# Patient Record
Sex: MALE | Race: WHITE | Employment: FULL TIME | URBAN - METROPOLITAN AREA
[De-identification: names, ages, dates, MRNs, and addresses within clinical notes are randomized per-mention and may not be internally consistent; named-entity substitution may affect disease eponyms.]

---

## 2017-06-18 ENCOUNTER — MYC REFILL (OUTPATIENT)
Dept: FAMILY MEDICINE | Facility: CLINIC | Age: 47
End: 2017-06-18

## 2017-06-18 DIAGNOSIS — R21 RASH: ICD-10-CM

## 2017-06-19 NOTE — TELEPHONE ENCOUNTER
Message from [employer]:  Original authorizing provider: MD West Weinbergkoby DUPREEDann Wallace would like a refill of the following medications:  hydrocortisone (WESTCORT) 0.2 % cream [Anthony Gutiérrez MD]    Preferred pharmacy: [facility] [id number] [address] AT [place] [address] & [facility]    Comment:

## 2017-06-19 NOTE — TELEPHONE ENCOUNTER
Hydrocortisone Cream      Last Written Prescription Date: 08/18/2015  Last Fill Quantity: 15g,  # refills: 1   Last Office Visit with [facility], [facility] or [facility] prescribing provider: 09/09/2016

## 2017-06-20 RX ORDER — HYDROCORTISONE VALERATE CREAM 2 MG/G
CREAM TOPICAL
Qty: 15 G | Refills: 1 | Status: SHIPPED | OUTPATIENT
Start: 2017-06-20 | End: 2020-02-19

## 2017-06-20 NOTE — TELEPHONE ENCOUNTER
Routing refill request to provider for review/approval because:  A break in medication  Marcia Bauman RN  [facility] [employer]

## 2017-06-20 NOTE — TELEPHONE ENCOUNTER
cpx 9/16, rx done, follow up prn, cpx fasting due in 9/17    BP Readings from Last 3 Encounters:   09/09/16 114/74   08/18/15 118/68   09/29/14 130/88

## 2017-08-25 DIAGNOSIS — I10 ESSENTIAL HYPERTENSION WITH GOAL BLOOD PRESSURE LESS THAN 140/90: ICD-10-CM

## 2017-08-25 DIAGNOSIS — E78.5 HYPERLIPIDEMIA LDL GOAL <130: ICD-10-CM

## 2017-08-25 DIAGNOSIS — F41.9 ANXIETY: ICD-10-CM

## 2017-08-25 DIAGNOSIS — F32.0 MAJOR DEPRESSIVE DISORDER, SINGLE EPISODE, MILD (H): ICD-10-CM

## 2017-08-25 RX ORDER — LISINOPRIL AND HYDROCHLOROTHIAZIDE 12.5; 2 MG/1; MG/1
TABLET ORAL
Qty: 30 TABLET | Refills: 0 | Status: SHIPPED | OUTPATIENT
Start: 2017-08-25 | End: 2017-09-29

## 2017-08-25 RX ORDER — ATORVASTATIN CALCIUM 80 MG/1
TABLET, FILM COATED ORAL
Qty: 15 TABLET | Refills: 0 | Status: SHIPPED | OUTPATIENT
Start: 2017-08-25 | End: 2017-09-29

## 2017-08-25 NOTE — TELEPHONE ENCOUNTER
Due for an Office visit for further refills, only filled for 30 days       Nallely Brannon RN  [facility]

## 2017-08-25 NOTE — TELEPHONE ENCOUNTER
lisinopril-hydrochlorothiazide (PRINZIDE,ZESTORETIC) 20-12.5 MG per tablet      Last Written Prescription Date: 9/9/2016  Last Fill Quantity: 90 tablet, # refills: 3  Last Office Visit with [facility], [facility] or [facility] prescribing provider: 9/9/2016       Potassium   Date Value Ref Range Status   09/09/2016 4.5 3.4 - 5.3 mmol/L Final     Creatinine   Date Value Ref Range Status   09/09/2016 0.90 0.66 - 1.25 mg/dL Final     BP Readings from Last 3 Encounters:   09/09/16 114/74   08/18/15 118/68   09/29/14 130/88         sertraline (ZOLOFT) 50 MG tablet     Last Written Prescription Date: 9/9/2016  Last Fill Quantity: 90 tablet, # refills: 3  Last Office Visit with [facility] primary care provider:  9/9/2016        Last PHQ-9 score on record=   PHQ-9 SCORE 9/9/2016   Total Score -   Total Score MyChart -   Total Score 1         atorvastatin (LIPITOR) 80 MG tablet     Last Written Prescription Date: 9/12/2016  Last Fill Quantity: 45 tablet, # refills: 3  Last Office Visit with [facility], [facility] or [facility] prescribing provider: 9/9/2016       Lab Results   Component Value Date    CHOL 217 09/09/2016     Lab Results   Component Value Date    HDL 38 09/09/2016     Lab Results   Component Value Date     09/09/2016     Lab Results   Component Value Date    TRIG 251 09/09/2016     Lab Results   Component Value Date    CHOLHDLRATIO 4.1 08/18/2015

## 2017-09-24 DIAGNOSIS — F41.9 ANXIETY: ICD-10-CM

## 2017-09-24 DIAGNOSIS — E78.5 HYPERLIPIDEMIA LDL GOAL <130: ICD-10-CM

## 2017-09-24 DIAGNOSIS — F32.0 MAJOR DEPRESSIVE DISORDER, SINGLE EPISODE, MILD (H): ICD-10-CM

## 2017-09-24 DIAGNOSIS — I10 ESSENTIAL HYPERTENSION WITH GOAL BLOOD PRESSURE LESS THAN 140/90: ICD-10-CM

## 2017-09-25 RX ORDER — LISINOPRIL AND HYDROCHLOROTHIAZIDE 12.5; 2 MG/1; MG/1
TABLET ORAL
Qty: 30 TABLET | Refills: 0 | OUTPATIENT
Start: 2017-09-25

## 2017-09-25 NOTE — TELEPHONE ENCOUNTER
Advised pharmacy, pt due for OV.    Please call pt to schedule OV.    Doretha Mcdermott RN  [place] [facility]

## 2017-09-25 NOTE — TELEPHONE ENCOUNTER
Sertraline -- NOTE - Due for an OV for further refills, only fill for 30 days   Last Written Prescription Date: 08/25/2017  Last Fill Quantity: 30, # refills: 0  Last Office Visit with [facility] primary care provider:  09/09/2016        Last PHQ-9 score on record=   PHQ-9 SCORE 9/9/2016   Total Score -   Total Score MyChart -   Total Score 1     ABI-7 SCORE 9/29/2014 8/18/2015 9/9/2016   Total Score 4 1 -   Total Score - - 1       Lisinopril-HCTZ -- NOTE - Due for an OV for further refills, only fill for 30 days     Last Written Prescription Date: 08/25/2017  Last Fill Quantity: 30, # refills: 0  Last Office Visit with [facility], [facility] or [facility] prescribing provider: 09/09/2016       Potassium   Date Value Ref Range Status   09/09/2016 4.5 3.4 - 5.3 mmol/L Final     Creatinine   Date Value Ref Range Status   09/09/2016 0.90 0.66 - 1.25 mg/dL Final     BP Readings from Last 3 Encounters:   09/09/16 114/74   08/18/15 118/68   09/29/14 130/88

## 2017-09-26 NOTE — TELEPHONE ENCOUNTER
Attempt #1  Called   Telephone Information:   Mobile [phone]   Patient no longer @ this phone # (# taken out of chart)    Called [phone] - Left a nondetailed message     Nallely Brannon RN  [facility]

## 2017-09-28 NOTE — TELEPHONE ENCOUNTER
Attempt #2  Called # below - Left a nondetailed message    Nallely Brannon RN  [place] [facility]

## 2017-09-29 RX ORDER — LISINOPRIL AND HYDROCHLOROTHIAZIDE 12.5; 2 MG/1; MG/1
1 TABLET ORAL DAILY
Qty: 15 TABLET | Refills: 0 | Status: SHIPPED | OUTPATIENT
Start: 2017-09-29 | End: 2017-10-16

## 2017-09-29 RX ORDER — ATORVASTATIN CALCIUM 80 MG/1
40 TABLET, FILM COATED ORAL DAILY
Qty: 15 TABLET | Refills: 0 | Status: SHIPPED | OUTPATIENT
Start: 2017-09-29 | End: 2017-10-30

## 2017-09-29 NOTE — TELEPHONE ENCOUNTER
Attempt # 1    Left non-detailed VM for patient to call back.    Toshia March, NATHANAEL, RN, PHN  [facility]

## 2017-09-29 NOTE — TELEPHONE ENCOUNTER
Attempt #3  Called # below - Left a nondetailed message    Routing to PCP for further review/recommendations/orders. - would you like to refill without updated OV?    Nallely Brannon RN  [facility]

## 2017-10-02 NOTE — TELEPHONE ENCOUNTER
4 attempts made to contact patient.   Encounter closed     Nallely Brannon, CHRIS  [place] [facility]

## 2017-10-15 DIAGNOSIS — F41.9 ANXIETY: ICD-10-CM

## 2017-10-15 DIAGNOSIS — I10 ESSENTIAL HYPERTENSION WITH GOAL BLOOD PRESSURE LESS THAN 140/90: ICD-10-CM

## 2017-10-15 DIAGNOSIS — F32.0 MAJOR DEPRESSIVE DISORDER, SINGLE EPISODE, MILD (H): ICD-10-CM

## 2017-10-16 NOTE — TELEPHONE ENCOUNTER
lisinopril-hydrochlorothiazide (PRINZIDE/ZESTORETIC) 20-12.5 MG per tablet      Last Written Prescription Date: 9.29.17  Last Fill Quantity: 15, # refills: 0  Last Office Visit with [facility], [facility] or [facility] prescribing provider: 9.9.16       Potassium   Date Value Ref Range Status   09/09/2016 4.5 3.4 - 5.3 mmol/L Final     Creatinine   Date Value Ref Range Status   09/09/2016 0.90 0.66 - 1.25 mg/dL Final     BP Readings from Last 3 Encounters:   09/09/16 114/74   08/18/15 118/68   09/29/14 130/88     sertraline (ZOLOFT) 50 MG tablet     Last Written Prescription Date: 9.29.17  Last Fill Quantity: 15, # refills: 0  Last Office Visit with [facility] primary care provider:  9.9.16        Last PHQ-9 score on record=   PHQ-9 SCORE 9/9/2016   Total Score -   Total Score MyChart -   Total Score 1

## 2017-10-17 RX ORDER — LISINOPRIL AND HYDROCHLOROTHIAZIDE 12.5; 2 MG/1; MG/1
1 TABLET ORAL DAILY
Qty: 30 TABLET | Refills: 0 | Status: SHIPPED | OUTPATIENT
Start: 2017-10-17 | End: 2017-11-16

## 2017-10-17 NOTE — TELEPHONE ENCOUNTER
Interim refill already given and no office visit scheduled by patient.  See previous refill encounters.  Nursing has made several attempts to reach patient by phone.  Routing to  to review and advise.      NATHANAEL Andino, RN, N  [facility] [phone]

## 2017-10-30 DIAGNOSIS — E78.5 HYPERLIPIDEMIA LDL GOAL <130: ICD-10-CM

## 2017-10-30 RX ORDER — ATORVASTATIN CALCIUM 80 MG/1
TABLET, FILM COATED ORAL
Qty: 15 TABLET | Refills: 0 | Status: SHIPPED | OUTPATIENT
Start: 2017-10-30 | End: 2017-12-06

## 2017-10-30 NOTE — TELEPHONE ENCOUNTER
Routing refill request to provider for review/approval because:  Patient needs OV and labs has been notified multiple times and given lisa refill  Marcia Bauman RN - Triage  [facility]

## 2017-11-02 NOTE — TELEPHONE ENCOUNTER
Second attempt - Left non-detailed message for patient to call back.  (see message below)    Natalya Greene

## 2017-11-15 ENCOUNTER — MYC MEDICAL ADVICE (OUTPATIENT)
Dept: FAMILY MEDICINE | Facility: CLINIC | Age: 47
End: 2017-11-15

## 2017-11-16 DIAGNOSIS — F32.0 MAJOR DEPRESSIVE DISORDER, SINGLE EPISODE, MILD (H): ICD-10-CM

## 2017-11-16 DIAGNOSIS — I10 ESSENTIAL HYPERTENSION WITH GOAL BLOOD PRESSURE LESS THAN 140/90: ICD-10-CM

## 2017-11-16 DIAGNOSIS — F41.9 ANXIETY: ICD-10-CM

## 2017-11-16 NOTE — LETTER
[facility]  [address], [place] [address] [phone]  November 29, 2017    Obdulio Wallace  [address] [facility] [address]    Dear Obdulio,    I care about your health and have reviewed your health plan. I have reviewed your medical conditions, medication list, and lab results and am making recommendations based on this review, to better manage your health.    You are in particular need of attention regarding:  -Wellness (Physical) Visit    Please schedule this appointment with us soon as we are unable to fill any further medications for you until you are seen.    I am recommending that you:  -schedule a WELLNESS (Physical) APPOINTMENT with me.   I will check fasting labs the same day - nothing to eat except water and meds for 8-10 hours prior.      Here is a list of Health Maintenance topics that are due now or due soon:  Health Maintenance Due   Topic Date Due     Colon Cancer Screening - FIT Test - yearly  09/29/2015     Depression Assessment - every 6 months  03/09/2017     Flu Vaccine - yearly  09/01/2017     Basic Metabolic Lab - yearly  09/09/2017     Cholesterol Lab - yearly  09/09/2017     Microalbumin Lab - yearly  09/09/2017     Prostate Test (PSA) - yearly  09/09/2017     Depression Action Plan Review - yearly  09/09/2017     Wellness Visit with your Primary Provider - yearly  09/09/2017       Please call us at [phone] (or use Blog[employer]) to address the above recommendations.     Thank you for trusting [facility] and we appreciate the opportunity to serve you.  We look forward to supporting your healthcare needs in the future.    Healthy Regards,          Anthony Gutiérrez M.D.

## 2017-11-17 RX ORDER — LISINOPRIL AND HYDROCHLOROTHIAZIDE 12.5; 2 MG/1; MG/1
TABLET ORAL
Qty: 15 TABLET | Refills: 0 | Status: SHIPPED | OUTPATIENT
Start: 2017-11-17 | End: 2017-12-07

## 2017-11-17 NOTE — TELEPHONE ENCOUNTER
Routing to Dr Gutiérrez,  Left message today for patient to call back to schedule appointment.     Obdulio is overdue for fasting physical. Obdulio was given numerous lisa refills and we have tried to reach him many times.  Also sent [employer] message that is unread  Routing to Dr Gutiérrez for advice  Марина Browning RN- Triage [employer]

## 2017-11-27 DIAGNOSIS — E78.5 HYPERLIPIDEMIA LDL GOAL <130: ICD-10-CM

## 2017-11-28 RX ORDER — ATORVASTATIN CALCIUM 80 MG/1
TABLET, FILM COATED ORAL
Qty: 15 TABLET | Refills: 0 | OUTPATIENT
Start: 2017-11-28

## 2017-11-28 NOTE — TELEPHONE ENCOUNTER
Medication Detail         Disp Refills Start End OLAF     atorvastatin (LIPITOR) 80 MG tablet 15 tablet 0 10/30/2017  No     Sig: TAKE 1/2 TABLET(40 MG) BY MOUTH DAILY     Last Office Visit with [facility], [facility] or [facility] prescribing provider: 09/09/2016     Lab Results   Component Value Date    CHOL 217 09/09/2016     Lab Results   Component Value Date    HDL 38 09/09/2016     Lab Results   Component Value Date     09/09/2016     Lab Results   Component Value Date    TRIG 251 09/09/2016     Lab Results   Component Value Date    CHOLHDLRATIO 4.1 08/18/2015     Patient is overdue for fasting physical. Patient was given numerous lisa refills and we have tried to reach him many times.   No future appointment scheduled.     Routing to PCP for further review/recommendations/orders.    Nallely Brannon RN  [facility]

## 2017-11-29 DIAGNOSIS — F32.0 MAJOR DEPRESSIVE DISORDER, SINGLE EPISODE, MILD (H): ICD-10-CM

## 2017-11-29 DIAGNOSIS — F41.9 ANXIETY: ICD-10-CM

## 2017-11-29 NOTE — LETTER
[facility] - [address] [address] [phone]    December 4, 2017    Obdulio Wallace  [address] [address] [address] [facility] [address]      To Whom it May Concern:    My staff have been attempting to reach you in regards to a recent refill request for: multiple medications.  After reviewing your chart, you are overdue for a fasting physical.  Please contact my office at [phone].     Thank you for your time.        Sincerely,        Anthony Gutiérrez M.D./BRITTANY RN

## 2017-11-29 NOTE — TELEPHONE ENCOUNTER
Second attempt - Left non-detailed message for patient to call back.  (see message below)  Letter sent.  Closing encounter.    Natalya Greene

## 2017-11-30 NOTE — TELEPHONE ENCOUNTER
Attempt #1  Called [phone] - Left a non-detailed message to call back.    If patient calls back, please schedule a fasting physical and let RN team know    Nallely Brannon RN  [place] [facility]

## 2017-11-30 NOTE — TELEPHONE ENCOUNTER
Requested Prescriptions   Pending Prescriptions Disp Refills     sertraline (ZOLOFT) 50 MG tablet [Pharmacy Med Name: SERTRALINE 50MG TABLETS]  Last Written Prescription Date:  11/17/2017  Last Fill Quantity: 15 tablet,  # refills: 0   Last Office Visit with [facility], [facility] or [facility] prescribing provider:  9/9/2016   Future Office Visit:      15 tablet 0     Sig: TAKE 1 TABLET(50 MG) BY MOUTH DAILY    SSRIs Protocol Failed    11/29/2017  6:28 PM       Failed - PHQ-9 score less than 5 in past 6 months    Please review PHQ-9 score.   PHQ-9 SCORE 8/25/2016 9/6/2016 9/9/2016   Total Score - - -   Total Score MyChart - 2 (Minimal depression) -   Total Score 2 - 1     ABI-7 SCORE 9/29/2014 8/18/2015 9/9/2016   Total Score 4 1 -   Total Score - - 1          Failed - Recent (6 mo) or future visit with authorizing provider's specialty    Patient had office visit in the last 6 months or has a visit in the next 30 days with authorizing provider.  See chart review.            Passed - Medication is NOT Bupropion    If the medication is Bupropion (Wellbutrin), and the patient is taking for smoking cessation; OK to refill.         Passed - Patient is age 18 or older

## 2017-11-30 NOTE — TELEPHONE ENCOUNTER
Medication denied, pt has not followed through with OV. See below.  Routing back to refill pool, please schedule pt and route back to RN team for refill.    Doretha Mcdermott RN  [place] [facility]

## 2017-12-01 DIAGNOSIS — I10 ESSENTIAL HYPERTENSION WITH GOAL BLOOD PRESSURE LESS THAN 140/90: ICD-10-CM

## 2017-12-01 RX ORDER — LISINOPRIL AND HYDROCHLOROTHIAZIDE 12.5; 2 MG/1; MG/1
TABLET ORAL
Qty: 15 TABLET | Refills: 0 | OUTPATIENT
Start: 2017-12-01

## 2017-12-01 NOTE — TELEPHONE ENCOUNTER
Patient due for fasting physical    Attempt #1  Called [phone] - Left a non-detailed message to call back and speak with any triage nurse.    Nallely Brannon RN  [facility]

## 2017-12-01 NOTE — TELEPHONE ENCOUNTER
Attempt #2  Called # below - Left a non-detailed message to call back.     If patient calls back, please schedule a fasting physical and let RN team know     Nallely Brannon RN  [place] [facility]

## 2017-12-01 NOTE — TELEPHONE ENCOUNTER
Pt had partial refill 11/17/17 per provider.     Alerting pharmacy.     Routing to refill pool. Please schedule pt for further refills and route to RN staff.     Doretha Mcdermott RN  [place] [facility]

## 2017-12-04 NOTE — TELEPHONE ENCOUNTER
No future appointment scheduled    Attempt #3  Called # below - Left a non-detailed message to call back and speak with any triage nurse.    Letter sent    Routing to PCP for further review/recommendations/orders - would you like to refill without an updated OV?    Nallely Brannon RN  [facility]

## 2017-12-04 NOTE — TELEPHONE ENCOUNTER
No future appointment scheduled    Attempt #3 (see other refill request)  Called # below - Left a non-detailed message to call back and speak with any triage nurse.    Letter sent    Routing to PCP for further review/recommendations/orders - would you like to refill without an updated OV?    Nallely Brannon RN  [place] [facility]

## 2017-12-06 ENCOUNTER — TELEPHONE (OUTPATIENT)
Dept: FAMILY MEDICINE | Facility: CLINIC | Age: 47
End: 2017-12-06

## 2017-12-06 DIAGNOSIS — E78.5 HYPERLIPIDEMIA LDL GOAL <130: ICD-10-CM

## 2017-12-06 RX ORDER — ATORVASTATIN CALCIUM 80 MG/1
TABLET, FILM COATED ORAL
Qty: 15 TABLET | Refills: 0 | Status: SHIPPED | OUTPATIENT
Start: 2017-12-06 | End: 2017-12-13

## 2017-12-06 NOTE — TELEPHONE ENCOUNTER
Pt made upcoming appt, will be seen this coming Monday. Out of statin medication. Requested partial supply.    Doretha Mcdermott RN  [facility]

## 2017-12-07 RX ORDER — LISINOPRIL AND HYDROCHLOROTHIAZIDE 12.5; 2 MG/1; MG/1
1 TABLET ORAL DAILY
Qty: 30 TABLET | Refills: 0 | Status: SHIPPED | OUTPATIENT
Start: 2017-12-07 | End: 2017-12-13

## 2017-12-13 ENCOUNTER — OFFICE VISIT (OUTPATIENT)
Dept: FAMILY MEDICINE | Facility: CLINIC | Age: 47
End: 2017-12-13
Payer: COMMERCIAL

## 2017-12-13 VITALS
HEIGHT: 70 IN | WEIGHT: 212 LBS | DIASTOLIC BLOOD PRESSURE: 74 MMHG | RESPIRATION RATE: 12 BRPM | HEART RATE: 97 BPM | SYSTOLIC BLOOD PRESSURE: 112 MMHG | TEMPERATURE: 97 F | BODY MASS INDEX: 30.35 KG/M2 | OXYGEN SATURATION: 97 %

## 2017-12-13 DIAGNOSIS — Z82.49 FAMILY HISTORY OF CORONARY ARTERY DISEASE: ICD-10-CM

## 2017-12-13 DIAGNOSIS — I10 HYPERTENSION GOAL BP (BLOOD PRESSURE) < 140/90: ICD-10-CM

## 2017-12-13 DIAGNOSIS — Z12.5 SCREENING FOR PROSTATE CANCER: ICD-10-CM

## 2017-12-13 DIAGNOSIS — F32.0 MAJOR DEPRESSIVE DISORDER, SINGLE EPISODE, MILD (H): ICD-10-CM

## 2017-12-13 DIAGNOSIS — E78.5 HYPERLIPIDEMIA LDL GOAL <130: ICD-10-CM

## 2017-12-13 DIAGNOSIS — Z12.11 SCREEN FOR COLON CANCER: ICD-10-CM

## 2017-12-13 DIAGNOSIS — Z51.81 MEDICATION MONITORING ENCOUNTER: ICD-10-CM

## 2017-12-13 DIAGNOSIS — Z00.00 ENCOUNTER FOR ROUTINE ADULT HEALTH EXAMINATION WITHOUT ABNORMAL FINDINGS: Primary | ICD-10-CM

## 2017-12-13 DIAGNOSIS — F41.9 ANXIETY: ICD-10-CM

## 2017-12-13 LAB
ALBUMIN UR-MCNC: NEGATIVE MG/DL
APPEARANCE UR: CLEAR
BILIRUB UR QL STRIP: NEGATIVE
COLOR UR AUTO: YELLOW
ERYTHROCYTE [DISTWIDTH] IN BLOOD BY AUTOMATED COUNT: 12.5 % (ref 10–15)
GLUCOSE UR STRIP-MCNC: NEGATIVE MG/DL
HCT VFR BLD AUTO: 43.4 % (ref 40–53)
HGB BLD-MCNC: 15 G/DL (ref 13.3–17.7)
HGB UR QL STRIP: NEGATIVE
KETONES UR STRIP-MCNC: NEGATIVE MG/DL
LEUKOCYTE ESTERASE UR QL STRIP: NEGATIVE
MCH RBC QN AUTO: 30.7 PG (ref 26.5–33)
MCHC RBC AUTO-ENTMCNC: 34.6 G/DL (ref 31.5–36.5)
MCV RBC AUTO: 89 FL (ref 78–100)
NITRATE UR QL: NEGATIVE
PH UR STRIP: 6.5 PH (ref 5–7)
PLATELET # BLD AUTO: 264 10E9/L (ref 150–450)
RBC # BLD AUTO: 4.88 10E12/L (ref 4.4–5.9)
SOURCE: NORMAL
SP GR UR STRIP: 1.01 (ref 1–1.03)
UROBILINOGEN UR STRIP-ACNC: 0.2 EU/DL (ref 0.2–1)
WBC # BLD AUTO: 6.9 10E9/L (ref 4–11)

## 2017-12-13 PROCEDURE — 81003 URINALYSIS AUTO W/O SCOPE: CPT | Performed by: FAMILY MEDICINE

## 2017-12-13 PROCEDURE — 36415 COLL VENOUS BLD VENIPUNCTURE: CPT | Performed by: FAMILY MEDICINE

## 2017-12-13 PROCEDURE — 99396 PREV VISIT EST AGE 40-64: CPT | Performed by: FAMILY MEDICINE

## 2017-12-13 PROCEDURE — 80061 LIPID PANEL: CPT | Performed by: FAMILY MEDICINE

## 2017-12-13 PROCEDURE — 84443 ASSAY THYROID STIM HORMONE: CPT | Performed by: FAMILY MEDICINE

## 2017-12-13 PROCEDURE — 85027 COMPLETE CBC AUTOMATED: CPT | Performed by: FAMILY MEDICINE

## 2017-12-13 PROCEDURE — 82550 ASSAY OF CK (CPK): CPT | Performed by: FAMILY MEDICINE

## 2017-12-13 PROCEDURE — G0103 PSA SCREENING: HCPCS | Performed by: FAMILY MEDICINE

## 2017-12-13 PROCEDURE — 82043 UR ALBUMIN QUANTITATIVE: CPT | Performed by: FAMILY MEDICINE

## 2017-12-13 PROCEDURE — 80053 COMPREHEN METABOLIC PANEL: CPT | Performed by: FAMILY MEDICINE

## 2017-12-13 RX ORDER — ATORVASTATIN CALCIUM 80 MG/1
TABLET, FILM COATED ORAL
Qty: 45 TABLET | Refills: 3 | Status: SHIPPED | OUTPATIENT
Start: 2017-12-13 | End: 2018-11-27

## 2017-12-13 RX ORDER — LISINOPRIL AND HYDROCHLOROTHIAZIDE 12.5; 2 MG/1; MG/1
1 TABLET ORAL DAILY
Qty: 90 TABLET | Refills: 3 | Status: SHIPPED | OUTPATIENT
Start: 2017-12-13 | End: 2018-12-07

## 2017-12-13 ASSESSMENT — ANXIETY QUESTIONNAIRES
GAD7 TOTAL SCORE: 0
2. NOT BEING ABLE TO STOP OR CONTROL WORRYING: NOT AT ALL
3. WORRYING TOO MUCH ABOUT DIFFERENT THINGS: NOT AT ALL
6. BECOMING EASILY ANNOYED OR IRRITABLE: NOT AT ALL
IF YOU CHECKED OFF ANY PROBLEMS ON THIS QUESTIONNAIRE, HOW DIFFICULT HAVE THESE PROBLEMS MADE IT FOR YOU TO DO YOUR WORK, TAKE CARE OF THINGS AT HOME, OR GET ALONG WITH OTHER PEOPLE: NOT DIFFICULT AT ALL
5. BEING SO RESTLESS THAT IT IS HARD TO SIT STILL: NOT AT ALL
7. FEELING AFRAID AS IF SOMETHING AWFUL MIGHT HAPPEN: NOT AT ALL
1. FEELING NERVOUS, ANXIOUS, OR ON EDGE: NOT AT ALL

## 2017-12-13 ASSESSMENT — PATIENT HEALTH QUESTIONNAIRE - PHQ9
5. POOR APPETITE OR OVEREATING: NOT AT ALL
SUM OF ALL RESPONSES TO PHQ QUESTIONS 1-9: 0

## 2017-12-13 NOTE — PATIENT INSTRUCTIONS
Use Lipitor as discussed    Use Lisinopril as discussed    [facility]                        To reach your care team during and after hours:   [phone]  To reach our pharmacy:        [phone]    Clinic Hours                        Our clinic hours are:    Monday   7:30 am to 7:00 pm                  Tuesday through Friday 7:30 am to 5:00 pm                             Saturday   8:00 am to 12:00 pm      Sunday   Closed      Pharmacy Hours                        Our pharmacy hours are:    Monday   8:30 am to 7:00 pm       Tuesday to Friday  8:30 am to 6:00 pm                       Saturday    9:00 am to 1:00 pm              Sunday    Closed              There is also information available at our web site:  [email or web address]    If your provider ordered any lab tests and you do not receive the results within 10 business days, please call the clinic.    If you need a medication refill please contact your pharmacy.  Please allow 2-3 business days for your refill to be completed.    Our clinic offers telephone visits and e visits.  Please ask one of your team members to explain more.      Use [employer] (secure email communication and access to your chart) to send your primary care provider a message or make an appointment. Ask someone on your Team how to sign up for [employer].  Immunizations                      Immunization History   Administered Date(s) Administered     Influenza (IIV3) PF 11/01/2009, 10/01/2011, 10/01/2015     Pneumococcal 23 valent 01/18/2005     TD (ADULT, 7+) 01/18/2005     TDAP Vaccine (Boostrix) 03/02/2012     Twinrix A/B 01/18/2005, 07/12/2006, 02/04/2009        Health Maintenance                         Health Maintenance Due   Topic Date Due     Colon Cancer Screening - FIT Test - yearly  09/29/2015     Depression Assessment - every 6 months  03/09/2017     Flu Vaccine - yearly  09/01/2017     Basic Metabolic Lab - yearly  09/09/2017     Cholesterol Lab - yearly   09/09/2017     Microalbumin Lab - yearly  09/09/2017     Prostate Test (PSA) - yearly  09/09/2017     Depression Action Plan Review - yearly  09/09/2017     Wellness Visit with your Primary Provider - yearly  09/09/2017     Preventive Health Recommendations  Male Ages 40 to 49    Yearly exam:             See your health care provider every year in order to  o   Review health changes.   o   Discuss preventive care.    o   Review your medicines if your doctor has prescribed any.    You should be tested each year for STDs (sexually transmitted diseases) if you re at risk.     Have a cholesterol test every 5 years.     Have a colonoscopy (test for colon cancer) if someone in your family has had colon cancer or polyps before age 50.     After age 45, have a diabetes test (fasting glucose). If you are at risk for diabetes, you should have this test every 3 years.      Talk with your health care provider about whether or not a prostate cancer screening test (PSA) is right for you.    Shots: Get a flu shot each year. Get a tetanus shot every 10 years.     Nutrition:    Eat at least 5 servings of fruits and vegetables daily.     Eat whole-grain bread, whole-wheat pasta and brown rice instead of white grains and rice.     Talk to your provider about Calcium and Vitamin D.     Lifestyle    Exercise for at least 150 minutes a week (30 minutes a day, 5 days a week). This will help you control your weight and prevent disease.     Limit alcohol to one drink per day.     No smoking.     Wear sunscreen to prevent skin cancer.     See your dentist every six months for an exam and cleaning.

## 2017-12-13 NOTE — PROGRESS NOTES
SUBJECTIVE:   CC: Obdulio Wallace is an 47 year old male who presents for preventative health visit.     Healthy Habits:    Do you get at least three servings of calcium containing foods daily (dairy, green leafy vegetables, etc.)? yes    Amount of exercise or daily activities, outside of work: 4 day(s) per week    Problems taking medications regularly No    Medication side effects: No    Have you had an eye exam in the past two years? yes    Do you see a dentist twice per year? yes    Do you have sleep apnea, excessive snoring or daytime drowsiness?no     Hyperlipidemia Follow-Up    Rate your low fat/cholesterol diet?: good    Taking statin?  Yes, no muscle aches from statin    Other lipid medications/supplements?:  none    Lipitor 80 mg daily, Aspirin 81 mg daily.     Recent Labs   Lab Test  09/09/16   1120  08/18/15   0905  06/27/14   0801   CHOL  217*  161  199   HDL  38*  39*  38*   LDL  129*  91  130*   TRIG  251*  157*  153*   CHOLHDLRATIO   --   4.1  5.2*     Hypertension Follow-up    Outpatient blood pressures are not being checked.    Low Salt Diet: no added salt    Lisinopril-HCTZ 20-12.5 mg daily.     BP Readings from Last 3 Encounters:   12/13/17 112/74   09/09/16 114/74   08/18/15 118/68     Depression and Anxiety Follow-Up, PHQ = 0, ABI = 0    Status since last visit: Improved     Other associated symptoms:None    Complicating factors:     Significant life event: No     Current substance abuse: None    Zoloft 50 mg daily, well controlled.     PHQ-9 Score and MyChart F/U Questions 8/25/2016 9/9/2016 12/13/2017   Total Score 2 1 0   Q9: Suicide Ideation Not at all Not at all Not at all     ABI-7 SCORE 8/18/2015 9/9/2016 12/13/2017   Total Score 1 - -   Total Score - 1 0     Today's PHQ-2 Score:   PHQ-2 ( 1999 Pfizer) 12/13/2017 12/13/2017   Q1: Little interest or pleasure in doing things 0 0   Q2: Feeling down, depressed or hopeless 0 0   PHQ-2 Score 0 0   Q1: Little interest or pleasure in doing things  - -   Q2: Feeling down, depressed or hopeless - -   PHQ-2 Score - -     Abuse: Current or Past(Physical, Sexual or Emotional)- No  Do you feel safe in your environment - Yes    Social History   Substance Use Topics     Smoking status: Never Smoker     Smokeless tobacco: Never Used     Alcohol use 2.4 - 3.6 oz/week     4 - 6 Standard drinks or equivalent per week      Comment: 4-6 per week avg      If you drink alcohol do you typically have >3 drinks per day or >7 drinks per week? No                      Last PSA:   PSA   Date Value Ref Range Status   09/09/2016 0.66 0 - 4 ug/L Final     Comment:     Assay Method:  Chemiluminescence using Siemens Vista analyzer       Reviewed orders with patient. Reviewed health maintenance and updated orders accordingly -       Reviewed and updated as needed this visit by clinical staff  Tobacco  Allergies  Meds  Med Hx  Surg Hx  Fam Hx  Soc Hx        Reviewed and updated as needed this visit by Provider  Allergies        Health Maintenance     Colonoscopy:  Due at age 50   FIT:  Yearly, due (none on file)              PSA:  Yearly, due (last 9/16)   DEXA:  n/a    Health Maintenance Due   Topic Date Due     FIT Q1 YR  09/29/2015     PHQ-9 Q6 MONTHS  03/09/2017     INFLUENZA VACCINE (SYSTEM ASSIGNED)  09/01/2017     BMP Q1 YR  09/09/2017     LIPID MONITORING Q1 YEAR  09/09/2017     MICROALBUMIN Q1 YEAR  09/09/2017     PSA Q1 YR  09/09/2017     DEPRESSION ACTION PLAN Q1 YR  09/09/2017     WELLNESS VISIT Q1 YR  09/09/2017       Current Problem List    Patient Active Problem List   Diagnosis     Hyperlipidemia LDL goal <130     Hypertension goal BP (blood pressure) < 140/90     Major depressive disorder, single episode, mild (H)     Anxiety     Family history of coronary artery disease       Past Medical History    Past Medical History:   Diagnosis Date     Anxiety 1/14     Hyperlipidemia LDL goal <130 2001     Hypertension goal BP (blood pressure) < 140/90 2001     Major  depressive disorder, single episode, mild (H)      Mild intermittent asthma childhood    resolved       Past Surgical History    Past Surgical History:   Procedure Laterality Date     C LASIK (EMP) W OPT MYOPIA P1  2004     STRESS ECHO ([facility])  , 10/15    normal except increased bp     SURGICAL HISTORY OF -       jaw realignment - misbite       Current Medications    Current Outpatient Prescriptions   Medication Sig Dispense Refill     lisinopril-hydrochlorothiazide (PRINZIDE/ZESTORETIC) 20-12.5 MG per tablet Take 1 tablet by mouth daily 90 tablet 3     atorvastatin (LIPITOR) 80 MG tablet TAKE 1/2 TABLET(40 MG) BY MOUTH DAILY 45 tablet 3     sertraline (ZOLOFT) 50 MG tablet Take 1 tablet (50 mg) by mouth daily 90 tablet 3     [DISCONTINUED] lisinopril-hydrochlorothiazide (PRINZIDE/ZESTORETIC) 20-12.5 MG per tablet Take 1 tablet by mouth daily 30 tablet 0     [DISCONTINUED] atorvastatin (LIPITOR) 80 MG tablet TAKE 1/2 TABLET(40 MG) BY MOUTH DAILY 15 tablet 0     [DISCONTINUED] sertraline (ZOLOFT) 50 MG tablet TAKE 1 TABLET(50 MG) BY MOUTH DAILY 15 tablet 0     hydrocortisone (WESTCORT) 0.2 % cream Apply sparingly to affected area three times daily for 14 days. 15 g 1     ASPIRIN 81 MG OR TABS ONE DAILY 100 3     EXCEDRIN 250-250-65 MG OR TABS 2 TABLETS EVERY 6 HOURS AS NEEDED 80 0     ADVIL 200 MG OR TABS 1 TABLET EVERY 4 TO 6 HOURS AS NEEDED 120 0       Allergies    No Known Allergies    Immunizations    Immunization History   Administered Date(s) Administered     Influenza (IIV3) PF 2009, 10/01/2011, 10/01/2015     Pneumococcal 23 valent 2005     TD (ADULT, 7+) 2005     TDAP Vaccine (Boostrix) 2012     Twinrix A/B 2005, 2006, 2009       Family History    Family History   Problem Relation Age of Onset     Hypertension Mother      Lipids Mother      C.A.D. Mother      Hypertension Father       at age 63     Lipids Father      triglycerides     C.A.D. Father   "    DANIEACLAUDIA Maternal Grandmother      Lung Cancer Maternal Grandfather      smoker     Chronic Obstructive Pulmonary Disease Paternal Grandmother      smoker       Social History    Social History     Social History     Marital status:      Spouse name: Nicolasa     Number of children: 0     Years of education: 16     Occupational History      [employer]     Social History Main Topics     Smoking status: Never Smoker     Smokeless tobacco: Never Used     Alcohol use 2.4 - 3.6 oz/week     4 - 6 Standard drinks or equivalent per week      Comment: 4-6 per week avg     Drug use: No     Sexual activity: Yes     Partners: Female     Other Topics Concern     Caffeine Concern Yes     2 cups qd     Exercise Yes     3-4 times per week, walks 6-7 times per week     Seat Belt Yes     Parent/Sibling W/ Cabg, Mi Or Angioplasty Before 65f 55m? Yes     Social History Narrative       ROS:  Constitutional, HEENT, cardiovascular, pulmonary, GI, , musculoskeletal, neuro, skin, endocrine and psych systems are negative, except as in HPI or otherwise noted     This document serves as a record of the services and decisions personally performed and made by Anthony Gutiérrez MD FAA. It was created on their behalf by Kera Patino, a trained medical scribe. The creation of this document is based the provider's statements to the medical scribe.  Kera Patino December 13, 2017 2:35 PM      OBJECTIVE:   /74  Pulse 97  Temp 97  F (36.1  C) (Tympanic)  Resp 12  Ht 5' 10\" (1.778 m)  Wt 212 lb (96.2 kg)  SpO2 97%  BMI 30.42 kg/m2  EXAM:  GENERAL: healthy, alert and no distress, obese   HENT: ear canals and TM's normal upon viewing with otoscope, nose and mouth without ulcers or lesions upon viewing with otoscope  RESP: lungs clear to auscultation - no rales, no rhonchi, no wheezes  CV: regular rates and rhythm, normal S1 S2, no S3 or S4 and no murmur, no click or rub - no carotid bruits.  ABDOMEN: soft, no tenderness, no  " hepatosplenomegaly, no masses, normal bowel sounds  MS: extremities- no gross deformities noted, no edema  SKIN: no suspicious lesions, no rashes to visible skin  : testicles normal without atrophy or masses, no hernias, penis normal without urethral discharge  RECTAL: normal sphincter tone, no rectal masses, prostate smooth, without masses  NEURO: mentation intact and speech normal  BACK: no CVA tenderness, no paralumbar tenderness  PSYCH: affect normal/bright    ASSESSMENT/PLAN:       ICD-10-CM    1. Encounter for routine adult health examination without abnormal findings Z00.00 Comprehensive metabolic panel     Lipid panel reflex to direct LDL Fasting     CK total     CBC with platelets     TSH with free T4 reflex     Prostate spec antigen screen     Albumin Random Urine Quantitative with Creat Ratio     *UA reflex to Microscopic and Culture ([facility] (except [place] and [place])     Fecal colorectal cancer screen (FIT)   2. Hypertension goal BP (blood pressure) < 140/90 I10 Comprehensive metabolic panel     Albumin Random Urine Quantitative with Creat Ratio     *UA reflex to Microscopic and Culture ([facility] (except [place] and [place])     lisinopril-hydrochlorothiazide (PRINZIDE/ZESTORETIC) 20-12.5 MG per tablet   3. Hyperlipidemia LDL goal <130 E78.5 Comprehensive metabolic panel     Lipid panel reflex to direct LDL Fasting     CK total     atorvastatin (LIPITOR) 80 MG tablet   4. Major depressive disorder, single episode, mild (H) F32.0 TSH with free T4 reflex     sertraline (ZOLOFT) 50 MG tablet   5. Anxiety F41.9 TSH with free T4 reflex     sertraline (ZOLOFT) 50 MG tablet   6. Family history of coronary artery disease Z82.49 Lipid panel reflex to direct LDL Fasting   7. Screening for prostate cancer Z12.5 Prostate spec antigen screen   8. Screen for colon cancer Z12.11 Fecal colorectal cancer screen (FIT)   9. Medication monitoring encounter Z51.81  "Comprehensive metabolic panel     Lipid panel reflex to direct LDL Fasting     CK total     CBC with platelets     TSH with free T4 reflex     Albumin Random Urine Quantitative with Creat Ratio     *UA reflex to Microscopic and Culture ([facility] (except [place] and [place])     Discussed treatment/modality options, including risk and benefits, he desires advised alcohol consumption 1oz per day or less, advised aspirin 81 mg po daily, advised 1 multivitamin per day, advised calcium 3275-4280 mg/d and Vitamin D 800-1200 IU/d, advised dentist every 6 months, advised diet, exercise, and weight loss, advised opthalmologist every 1-2 years, advised self testicular exam q month, further diagnostic(s), further health care maintenance, further lab(s), medication refill(s), OTC meds, and observation. All diagnosis above reviewed and noted above, otherwise stable.  See [facility] orders for further details.  Follow up in 1/2 to 1 year(s) and as needed.    Health Maintenance Due   Topic Date Due     FIT Q1 YR  09/29/2015     PHQ-9 Q6 MONTHS  03/09/2017     INFLUENZA VACCINE (SYSTEM ASSIGNED)  09/01/2017     BMP Q1 YR  09/09/2017     LIPID MONITORING Q1 YEAR  09/09/2017     MICROALBUMIN Q1 YEAR  09/09/2017     PSA Q1 YR  09/09/2017     DEPRESSION ACTION PLAN Q1 YR  09/09/2017     WELLNESS VISIT Q1 YR  09/09/2017     COUNSELING:  Reviewed preventive health counseling, as reflected in patient instructions     reports that he has never smoked. He has never used smokeless tobacco.    Estimated body mass index is 30.42 kg/(m^2) as calculated from the following:    Height as of this encounter: 5' 10\" (1.778 m).    Weight as of this encounter: 212 lb (96.2 kg).   Weight management plan: Discussed healthy diet and exercise guidelines and patient will follow up in 12 months in clinic to re-evaluate.    Counseling Resources:  ATP IV Guidelines  Pooled Cohorts Equation Calculator  FRAX Risk Assessment  ICSI Preventive " Guidelines  Dietary Guidelines for Americans, 2010  USDA's MyPlate  ASA Prophylaxis  Lung CA Screening    The information in this document, created by the medical scribe for me, accurately reflects the services I personally performed and the decisions made by me. I have reviewed and approved this document for accuracy.   Anthony Gutiérrez MD FAAFP            Anthony Gutiérrez MD, FAAFP    [address]  [id number] [phone] Fax

## 2017-12-13 NOTE — NURSING NOTE
"Chief Complaint   Patient presents with     Physical       Initial /74  Pulse 97  Temp 97  F (36.1  C) (Tympanic)  Resp 12  Ht 5' 10\" (1.778 m)  Wt 212 lb (96.2 kg)  SpO2 97%  BMI 30.42 kg/m2 Estimated body mass index is 30.42 kg/(m^2) as calculated from the following:    Height as of this encounter: 5' 10\" (1.778 m).    Weight as of this encounter: 212 lb (96.2 kg).  Medication Reconciliation: complete   Trinh Bloedow LPN    "

## 2017-12-13 NOTE — MR AVS SNAPSHOT
After Visit Summary   12/13/2017    Obdulio Wallace    MRN: [id number]           Patient Information     Date Of Birth          1970        Visit Information        Provider Department      12/13/2017 2:40 PM Anthony Gutiérrez MD [facility]        Today's Diagnoses     Encounter for routine adult health examination without abnormal findings    -  1    Hypertension goal BP (blood pressure) < 140/90        Hyperlipidemia LDL goal <130        Major depressive disorder, single episode, mild (H)        Anxiety        Family history of coronary artery disease        Screening for prostate cancer        Screen for colon cancer        Medication monitoring encounter          Care Instructions    Use Lipitor as discussed  Use Lisinopril as discussed    [facility]                        To reach your care team during and after hours:   [phone]  To reach our pharmacy:        [phone]    Clinic Hours                        Our clinic hours are:    Monday   7:30 am to 7:00 pm                  Tuesday through Friday 7:30 am to 5:00 pm                             Saturday   8:00 am to 12:00 pm      Sunday   Closed      Pharmacy Hours                        Our pharmacy hours are:    Monday   8:30 am to 7:00 pm       Tuesday to Friday  8:30 am to 6:00 pm                       Saturday    9:00 am to 1:00 pm              Sunday    Closed              There is also information available at our web site:  [email or web address]    If your provider ordered any lab tests and you do not receive the results within 10 business days, please call the clinic.    If you need a medication refill please contact your pharmacy.  Please allow 2-3 business days for your refill to be completed.    Our clinic offers telephone visits and e visits.  Please ask one of your team members to explain more.      Use [employer]oServ (secure email communication and access to your chart) to send your primary care provider  a message or make an appointment. Ask someone on your Team how to sign up for [facility].  Immunizations                      Immunization History   Administered Date(s) Administered     Influenza (IIV3) PF 11/01/2009, 10/01/2011, 10/01/2015     Pneumococcal 23 valent 01/18/2005     TD (ADULT, 7+) 01/18/2005     TDAP Vaccine (Boostrix) 03/02/2012     Twinrix A/B 01/18/2005, 07/12/2006, 02/04/2009        Health Maintenance                         Health Maintenance Due   Topic Date Due     Colon Cancer Screening - FIT Test - yearly  09/29/2015     Depression Assessment - every 6 months  03/09/2017     Flu Vaccine - yearly  09/01/2017     Basic Metabolic Lab - yearly  09/09/2017     Cholesterol Lab - yearly  09/09/2017     Microalbumin Lab - yearly  09/09/2017     Prostate Test (PSA) - yearly  09/09/2017     Depression Action Plan Review - yearly  09/09/2017     Wellness Visit with your Primary Provider - yearly  09/09/2017     Preventive Health Recommendations  Male Ages 40 to 49    Yearly exam:             See your health care provider every year in order to  o   Review health changes.   o   Discuss preventive care.    o   Review your medicines if your doctor has prescribed any.    You should be tested each year for STDs (sexually transmitted diseases) if you re at risk.     Have a cholesterol test every 5 years.     Have a colonoscopy (test for colon cancer) if someone in your family has had colon cancer or polyps before age 50.     After age 45, have a diabetes test (fasting glucose). If you are at risk for diabetes, you should have this test every 3 years.      Talk with your health care provider about whether or not a prostate cancer screening test (PSA) is right for you.    Shots: Get a flu shot each year. Get a tetanus shot every 10 years.     Nutrition:    Eat at least 5 servings of fruits and vegetables daily.     Eat whole-grain bread, whole-wheat pasta and brown rice instead of white grains and rice.     Talk  to your provider about Calcium and Vitamin D.     Lifestyle    Exercise for at least 150 minutes a week (30 minutes a day, 5 days a week). This will help you control your weight and prevent disease.     Limit alcohol to one drink per day.     No smoking.     Wear sunscreen to prevent skin cancer.     See your dentist every six months for an exam and cleaning.             Follow-ups after your visit        Future tests that were ordered for you today     Open Future Orders        Priority Expected Expires Ordered    Fecal colorectal cancer screen (FIT) Routine 1/3/2018 3/7/2018 12/13/2017            Who to contact     If you have questions or need follow up information about today's clinic visit or your schedule please contact [facility] directly at [phone].  Normal or non-critical lab and imaging results will be communicated to you by Social Rewardshart, letter or phone within 4 business days after the clinic has received the results. If you do not hear from us within 7 days, please contact the clinic through Gridstoret or phone. If you have a critical or abnormal lab result, we will notify you by phone as soon as possible.  Submit refill requests through TrendMD or call your pharmacy and they will forward the refill request to us. Please allow 3 business days for your refill to be completed.          Additional Information About Your Visit        TrendMD Information     TrendMD gives you secure access to your electronic health record. If you see a primary care provider, you can also send messages to your care team and make appointments. If you have questions, please call your primary care clinic.  If you do not have a primary care provider, please call [phone] and they will assist you.        Care EveryWhere ID     This is your Care EveryWhere ID. This could be used by other organizations to access your [facility] medical records  [id number]        Your Vitals Were     Pulse Temperature Respirations  "Height Pulse Oximetry BMI (Body Mass Index)    97 97  F (36.1  C) (Tympanic) 12 5' 10\" (1.778 m) 97% 30.42 kg/m2       Blood Pressure from Last 3 Encounters:   12/13/17 112/74   09/09/16 114/74   08/18/15 118/68    Weight from Last 3 Encounters:   12/13/17 212 lb (96.2 kg)   09/09/16 214 lb (97.1 kg)   08/18/15 214 lb (97.1 kg)              We Performed the Following     *UA reflex to Microscopic and Culture ([place] [facility] (except [place] and [place])     Albumin Random Urine Quantitative with Creat Ratio     CBC with platelets     CK total     Comprehensive metabolic panel     Lipid panel reflex to direct LDL Fasting     Prostate spec antigen screen     TSH with free T4 reflex          Today's Medication Changes          These changes are accurate as of: 12/13/17  3:07 PM.  If you have any questions, ask your nurse or doctor.               These medicines have changed or have updated prescriptions.        Dose/Directions    sertraline 50 MG tablet   Commonly known as:  ZOLOFT   This may have changed:  See the new instructions.   Used for:  Major depressive disorder, single episode, mild (H), Anxiety   Changed by:  Anthony Gutiérrez MD        Dose:  50 mg   Take 1 tablet (50 mg) by mouth daily   Quantity:  90 tablet   Refills:  3            Where to get your medicines      These medications were sent to [facility] [id number] [place] [address] AT [facility] [address] [address]    Hours:  24-hours Phone:  [phone]     atorvastatin 80 MG tablet    lisinopril-hydrochlorothiazide 20-12.5 MG per tablet    sertraline 50 MG tablet                Primary Care Provider Office Phone # Fax #    Anthony Gutiérrez [phone]       [address] [place] [id number]        Equal Access to Services     KLAUS SANTANA AH: Lorena macielo Soomaali, waaxda luqadaha, qaybta kaalmada adeegyada, maria e baker " ah. So [facility] [phone].    ATENCIÓN: Si carlie rivera, tiene a gray disposición servicios gratuitos de asistencia lingüística. Cleo kaba [phone].    We comply with applicable federal civil rights laws and [place] laws. We do not discriminate on the basis of race, color, national origin, age, disability, sex, sexual orientation, or gender identity.            Thank you!     Thank you for choosing [facility]  for your care. Our goal is always to provide you with excellent care. Hearing back from our patients is one way we can continue to improve our services. Please take a few minutes to complete the written survey that you may receive in the mail after your visit with us. Thank you!             Your Updated Medication List - Protect others around you: Learn how to safely use, store and throw away your medicines at www.disposemymeds.org.          This list is accurate as of: 12/13/17  3:07 PM.  Always use your most recent med list.                   Brand Name Dispense Instructions for use Diagnosis    ADVIL 200 MG tablet   Generic drug:  ibuprofen     120    1 TABLET EVERY 4 TO 6 HOURS AS NEEDED        aspirin 81 MG tablet     100    ONE DAILY    Family history of coronary artery disease, Essential hypertension, benign, Other and unspecified hyperlipidemia       atorvastatin 80 MG tablet    LIPITOR    45 tablet    TAKE 1/2 TABLET(40 MG) BY MOUTH DAILY    Hyperlipidemia LDL goal <130       EXCEDRIN 250-250-65 MG per tablet   Generic drug:  aspirin-acetaminophen-caffeine     80    2 TABLETS EVERY 6 HOURS AS NEEDED        hydrocortisone 0.2 % cream    WESTCORT    15 g    Apply sparingly to affected area three times daily for 14 days.    Rash       lisinopril-hydrochlorothiazide 20-12.5 MG per tablet    PRINZIDE/ZESTORETIC    90 tablet    Take 1 tablet by mouth daily    Hypertension goal BP (blood pressure) < 140/90       sertraline 50 MG tablet    ZOLOFT    90 tablet    Take 1 tablet (50 mg) by  mouth daily    Major depressive disorder, single episode, mild (H), Anxiety

## 2017-12-13 NOTE — LETTER
[facility]  [address] [place] [id number]                  [phone]   December 18, 2017    Obdulio Wallace  [address] [facility] [id number]      Dear Obdulio,    Here is a summary of your recent test results:    Labs are overall quite good.     We advise:     Continue current cares.   Balanced low cholesterol diet.   Regular exercise.     For additional lab test information, [email or web address] is an excellent reference.     Your test results are enclosed.      Please contact me if you have any questions.    In addition, here is a list of due or overdue Health Maintenance reminders.    Health Maintenance Due   Topic Date Due     Colon Cancer Screening - FIT Test - yearly  09/29/2015     Flu Vaccine - yearly  09/01/2017     Depression Action Plan Review - yearly  09/09/2017       Please call us at [phone] (or use [employer]) to address the above recommendations.            Thank you very much for trusting [facility]..     Healthy regards,        Anthony Gutiérrez M.D.        Results for orders placed or performed in visit on 12/13/17   Comprehensive metabolic panel   Result Value Ref Range    Sodium 138 133 - 144 mmol/L    Potassium 3.9 3.4 - 5.3 mmol/L    Chloride 104 94 - 109 mmol/L    Carbon Dioxide 20 20 - 32 mmol/L    Anion Gap 14 3 - 14 mmol/L    Glucose 82 70 - 99 mg/dL    Urea Nitrogen 16 7 - 30 mg/dL    Creatinine 0.80 0.66 - 1.25 mg/dL    GFR Estimate >90 >60 mL/min/1.7m2    GFR Estimate If Black >90 >60 mL/min/1.7m2    Calcium 9.5 8.5 - 10.1 mg/dL    Bilirubin Total 0.7 0.2 - 1.3 mg/dL    Albumin 4.3 3.4 - 5.0 g/dL    Protein Total 8.0 6.8 - 8.8 g/dL    Alkaline Phosphatase 94 40 - 150 U/L    ALT 55 0 - 70 U/L    AST 30 0 - 45 U/L   Lipid panel reflex to direct LDL Fasting   Result Value Ref Range    Cholesterol 169 <200 mg/dL    Triglycerides 147 <150 mg/dL    HDL Cholesterol 49 >39 mg/dL    LDL Cholesterol Calculated 91 <100  mg/dL    Non HDL Cholesterol 120 <130 mg/dL   CK total   Result Value Ref Range    CK Total 167 30 - 300 U/L   CBC with platelets   Result Value Ref Range    WBC 6.9 4.0 - 11.0 10e9/L    RBC Count 4.88 4.4 - 5.9 10e12/L    Hemoglobin 15.0 13.3 - 17.7 g/dL    Hematocrit 43.4 40.0 - 53.0 %    MCV 89 78 - 100 fl    MCH 30.7 26.5 - 33.0 pg    MCHC 34.6 31.5 - 36.5 g/dL    RDW 12.5 10.0 - 15.0 %    Platelet Count 264 150 - 450 10e9/L   TSH with free T4 reflex   Result Value Ref Range    TSH 1.59 0.40 - 4.00 mU/L   Prostate spec antigen screen   Result Value Ref Range    PSA 0.54 0 - 4 ug/L   Albumin Random Urine Quantitative with Creat Ratio   Result Value Ref Range    Creatinine Urine 63 mg/dL    Albumin Urine mg/L <5 mg/L    Albumin Urine mg/g Cr Unable to calculate due to low value 0 - 17 mg/g Cr   *UA reflex to Microscopic and Culture ([facility] [facility] (except [place] and [place])   Result Value Ref Range    Color Urine Yellow     Appearance Urine Clear     Glucose Urine Negative NEG^Negative mg/dL    Bilirubin Urine Negative NEG^Negative    Ketones Urine Negative NEG^Negative mg/dL    Specific Gravity Urine 1.010 1.003 - 1.035    Blood Urine Negative NEG^Negative    pH Urine 6.5 5.0 - 7.0 pH    Protein Albumin Urine Negative NEG^Negative mg/dL    Urobilinogen Urine 0.2 0.2 - 1.0 EU/dL    Nitrite Urine Negative NEG^Negative    Leukocyte Esterase Urine Negative NEG^Negative    Source Midstream Urine

## 2017-12-14 LAB
ALBUMIN SERPL-MCNC: 4.3 G/DL (ref 3.4–5)
ALP SERPL-CCNC: 94 U/L (ref 40–150)
ALT SERPL W P-5'-P-CCNC: 55 U/L (ref 0–70)
ANION GAP SERPL CALCULATED.3IONS-SCNC: 14 MMOL/L (ref 3–14)
AST SERPL W P-5'-P-CCNC: 30 U/L (ref 0–45)
BILIRUB SERPL-MCNC: 0.7 MG/DL (ref 0.2–1.3)
BUN SERPL-MCNC: 16 MG/DL (ref 7–30)
CALCIUM SERPL-MCNC: 9.5 MG/DL (ref 8.5–10.1)
CHLORIDE SERPL-SCNC: 104 MMOL/L (ref 94–109)
CHOLEST SERPL-MCNC: 169 MG/DL
CK SERPL-CCNC: 167 U/L (ref 30–300)
CO2 SERPL-SCNC: 20 MMOL/L (ref 20–32)
CREAT SERPL-MCNC: 0.8 MG/DL (ref 0.66–1.25)
CREAT UR-MCNC: 63 MG/DL
GFR SERPL CREATININE-BSD FRML MDRD: >90 ML/MIN/1.7M2
GLUCOSE SERPL-MCNC: 82 MG/DL (ref 70–99)
HDLC SERPL-MCNC: 49 MG/DL
LDLC SERPL CALC-MCNC: 91 MG/DL
MICROALBUMIN UR-MCNC: <5 MG/L
MICROALBUMIN/CREAT UR: NORMAL MG/G CR (ref 0–17)
NONHDLC SERPL-MCNC: 120 MG/DL
POTASSIUM SERPL-SCNC: 3.9 MMOL/L (ref 3.4–5.3)
PROT SERPL-MCNC: 8 G/DL (ref 6.8–8.8)
PSA SERPL-ACNC: 0.54 UG/L (ref 0–4)
SODIUM SERPL-SCNC: 138 MMOL/L (ref 133–144)
TRIGL SERPL-MCNC: 147 MG/DL
TSH SERPL DL<=0.005 MIU/L-ACNC: 1.59 MU/L (ref 0.4–4)

## 2017-12-14 ASSESSMENT — ANXIETY QUESTIONNAIRES: GAD7 TOTAL SCORE: 0

## 2018-01-05 DIAGNOSIS — I10 ESSENTIAL HYPERTENSION WITH GOAL BLOOD PRESSURE LESS THAN 140/90: ICD-10-CM

## 2018-01-07 RX ORDER — LISINOPRIL AND HYDROCHLOROTHIAZIDE 12.5; 2 MG/1; MG/1
TABLET ORAL
Qty: 30 TABLET | Refills: 0 | OUTPATIENT
Start: 2018-01-07

## 2018-01-08 NOTE — TELEPHONE ENCOUNTER
Duplicate- sent 12/13/17 x one year- info sent to pharmacy.  Dilia CunninghamRN  [facility]  [phone]    Requested Prescriptions   Pending Prescriptions Disp Refills     lisinopril-hydrochlorothiazide (PRINZIDE/ZESTORETIC) 20-12.5 MG per tablet [Pharmacy Med Name: LISINOPRIL-HCTZ 20/12.5MG TABLETS] 30 tablet 0     Sig: TAKE 1 TABLET BY MOUTH DAILY    Diuretics (Including Combos) Protocol Passed    1/5/2018  4:12 AM       Passed - Blood pressure under 140/90    BP Readings from Last 3 Encounters:   12/13/17 112/74   09/09/16 114/74   08/18/15 118/68                Passed - Recent or future visit with authorizing provider's specialty    Patient had office visit in the last year or has a visit in the next 30 days with authorizing provider.  See chart review.              Passed - Patient is age 18 or older       Passed - Normal serum creatinine on file in past 12 months    Recent Labs   Lab Test  12/13/17   1443   CR  0.80             Passed - Normal serum potassium on file in past 12 months    Recent Labs   Lab Test  12/13/17   1443   POTASSIUM  3.9                   Passed - Normal serum sodium on file in past 12 months    Recent Labs   Lab Test  12/13/17   1443   NA  138

## 2018-11-27 DIAGNOSIS — F32.0 MAJOR DEPRESSIVE DISORDER, SINGLE EPISODE, MILD (H): ICD-10-CM

## 2018-11-27 DIAGNOSIS — F41.9 ANXIETY: ICD-10-CM

## 2018-11-27 DIAGNOSIS — E78.5 HYPERLIPIDEMIA LDL GOAL <130: ICD-10-CM

## 2018-11-27 NOTE — TELEPHONE ENCOUNTER
"Requested Prescriptions   Pending Prescriptions Disp Refills     sertraline (ZOLOFT) 50 MG tablet [Pharmacy Med Name: SERTRALINE 50MG TABLETS] 90 tablet 0      Last Written Prescription Date:  12.13.17  Last Fill Quantity: 90,  # refills: 3   Last Office Visit: 12/13/2017   Future Office Visit:       PHQ-9 SCORE 9/6/2016 9/9/2016 12/13/2017   PHQ-9 Total Score - - -   PHQ-9 Total Score MyChart 2 (Minimal depression) - -   PHQ-9 Total Score - 1 0     ABI-7 SCORE 8/18/2015 9/9/2016 12/13/2017   Total Score 1 - -   Total Score - 1 0          Sig: TAKE 1 TABLET(50 MG) BY MOUTH DAILY    SSRIs Protocol Failed    11/27/2018  4:12 AM       Failed - PHQ-9 score less than 5 in past 6 months    Please review last PHQ-9 score.          Failed - Recent (6 mo) or future (30 days) visit within the authorizing provider's specialty    Patient had office visit in the last 6 months or has a visit in the next 30 days with authorizing provider or within the authorizing provider's specialty.  See \"Patient Info\" tab in inbasket, or \"Choose Columns\" in Meds & Orders section of the refill encounter.           Passed - Patient is age 18 or older        atorvastatin (LIPITOR) 80 MG tablet [Pharmacy Med Name: ATORVASTATIN 80MG TABLETS] 45 tablet 0      Last Written Prescription Date:  12.13.17  Last Fill Quantity: 45,  # refills: 3   Last Office Visit: 12/13/2017   Future Office Visit:      Sig: TAKE 1/2 TABLET(40 MG) BY MOUTH DAILY    Statins Protocol Passed    11/27/2018  4:12 AM       Passed - LDL on file in past 12 months    Recent Labs   Lab Test  12/13/17   1443   LDL  91            Passed - No abnormal creatine kinase in past 12 months    Recent Labs   Lab Test  12/13/17   1443   CKT  167               Passed - Recent (12 mo) or future (30 days) visit within the authorizing provider's specialty    Patient had office visit in the last 12 months or has a visit in the next 30 days with authorizing provider or within the authorizing " "provider's specialty.  See \"Patient Info\" tab in inbasket, or \"Choose Columns\" in Meds & Orders section of the refill encounter.             Passed - Patient is age 18 or older          "

## 2018-11-28 RX ORDER — ATORVASTATIN CALCIUM 80 MG/1
TABLET, FILM COATED ORAL
Qty: 15 TABLET | Refills: 0 | Status: SHIPPED | OUTPATIENT
Start: 2018-11-28 | End: 2019-02-12

## 2018-11-28 NOTE — TELEPHONE ENCOUNTER
Due for an Office visit for further refills, only fill for 30 days     Gaviota Giles RN, BSN  [facility]

## 2018-12-07 DIAGNOSIS — I10 HYPERTENSION GOAL BP (BLOOD PRESSURE) < 140/90: ICD-10-CM

## 2018-12-07 RX ORDER — LISINOPRIL AND HYDROCHLOROTHIAZIDE 12.5; 2 MG/1; MG/1
TABLET ORAL
Qty: 90 TABLET | Refills: 0 | Status: SHIPPED | OUTPATIENT
Start: 2018-12-07 | End: 2019-02-12

## 2018-12-07 NOTE — TELEPHONE ENCOUNTER
"Requested Prescriptions   Pending Prescriptions Disp Refills     lisinopril-hydrochlorothiazide (PRINZIDE/ZESTORETIC) 20-12.5 MG tablet [Pharmacy Med Name: LISINOPRIL-HCTZ 20/12.5MG TABLETS] 90 tablet 0     Sig: TAKE 1 TABLET BY MOUTH DAILY    Diuretics (Including Combos) Protocol Passed    12/7/2018  4:12 AM       Passed - Blood pressure under 140/90 in past 12 months    BP Readings from Last 3 Encounters:   12/13/17 112/74   09/09/16 114/74   08/18/15 118/68                Passed - Recent (12 mo) or future (30 days) visit within the authorizing provider's specialty    Patient had office visit in the last 12 months or has a visit in the next 30 days with authorizing provider or within the authorizing provider's specialty.  See \"Patient Info\" tab in inbasket, or \"Choose Columns\" in Meds & Orders section of the refill encounter.             Passed - Patient is age 18 or older       Passed - Normal serum creatinine on file in past 12 months    Recent Labs   Lab Test  12/13/17   1443   CR  0.80             Passed - Normal serum potassium on file in past 12 months    Recent Labs   Lab Test  12/13/17   1443   POTASSIUM  3.9                   Passed - Normal serum sodium on file in past 12 months    Recent Labs   Lab Test  12/13/17   1443   NA  138              Medication is being filled for 1 time refill only due to:  Patient needs to be seen because it has been more than one year since last visit.   Doretha Mcdermott RN  [facility]      "

## 2018-12-27 DIAGNOSIS — F32.0 MAJOR DEPRESSIVE DISORDER, SINGLE EPISODE, MILD (H): ICD-10-CM

## 2018-12-27 DIAGNOSIS — E78.5 HYPERLIPIDEMIA LDL GOAL <130: ICD-10-CM

## 2018-12-27 DIAGNOSIS — F41.9 ANXIETY: ICD-10-CM

## 2018-12-27 NOTE — TELEPHONE ENCOUNTER
"Requested Prescriptions   Pending Prescriptions Disp Refills     sertraline (ZOLOFT) 50 MG tablet [Pharmacy Med Name: SERTRALINE 50MG TABLETS]  Last Written Prescription Date:  11/28/2018  Last Fill Quantity: 30 tablet,  # refills: 0   Last office visit: 12/13/2017 with prescribing provider:  Anthony Gutiérrez MD    Future Office Visit:     30 tablet 0     Sig: TAKE 1 TABLET BY MOUTH DAILY**OFFICE VISIT NEEDED FOR FURTHER REFILLS**    SSRIs Protocol Failed - 12/27/2018  4:13 AM       Failed - PHQ-9 score less than 5 in past 6 months    Please review last PHQ-9 score.   PHQ-9 SCORE 9/6/2016 9/9/2016 12/13/2017   PHQ-9 Total Score - - -   PHQ-9 Total Score MyChart 2 (Minimal depression) - -   PHQ-9 Total Score - 1 0     ABI-7 SCORE 8/18/2015 9/9/2016 12/13/2017   Total Score 1 - -   Total Score - 1 0              Failed - Recent (6 mo) or future (30 days) visit within the authorizing provider's specialty    Patient had office visit in the last 6 months or has a visit in the next 30 days with authorizing provider or within the authorizing provider's specialty.  See \"Patient Info\" tab in inbasket, or \"Choose Columns\" in Meds & Orders section of the refill encounter.           Passed - Patient is age 18 or older        atorvastatin (LIPITOR) 80 MG tablet [Pharmacy Med Name: ATORVASTATIN 80MG TABLETS]  Last Written Prescription Date:  11/28/2018  Last Fill Quantity: 15 tablet,  # refills: 0   Last office visit: 12/13/2017 with prescribing provider:  Anthony Gutiérrez MD    Future Office Visit:     15 tablet 0     Sig: TAKE 1/2 TABLET BY MOUTH DAILY**OFFICE VISIT NEEDED FOR FURTHER REFILLS**    Statins Protocol Failed - 12/27/2018  4:13 AM       Failed - LDL on file in past 12 months    Recent Labs   Lab Test 12/13/17  1443   LDL 91            Failed - Recent (12 mo) or future (30 days) visit within the authorizing provider's specialty    Patient had office visit in the last 12 months or has a visit in the next 30 days with " "authorizing provider or within the authorizing provider's specialty.  See \"Patient Info\" tab in inbasket, or \"Choose Columns\" in Meds & Orders section of the refill encounter.             Passed - No abnormal creatine kinase in past 12 months    Recent Labs   Lab Test 12/13/17  1443                  Passed - Patient is age 18 or older          "

## 2018-12-28 RX ORDER — ATORVASTATIN CALCIUM 80 MG/1
TABLET, FILM COATED ORAL
Qty: 15 TABLET | Refills: 0 | OUTPATIENT
Start: 2018-12-28

## 2018-12-28 NOTE — TELEPHONE ENCOUNTER
Patient already given 1x lisa refill and did not follow up (due for FASTING PHYSICAL) - no future appointment scheduled  Rx denied. Pharmacy notified    Routing to response pool to call/schedule      Nallely Brannon RN  [facility]

## 2019-01-02 NOTE — TELEPHONE ENCOUNTER
Attempt #2  Called patient @ [phone] (home) - Left a non-detailed message to call back.    If patient calls back, please schedule a FASTING PHYSICAL and route back to RN team.       Nallely Brannon RN  [facility]

## 2019-01-24 DIAGNOSIS — E78.5 HYPERLIPIDEMIA LDL GOAL <130: ICD-10-CM

## 2019-01-25 RX ORDER — ATORVASTATIN CALCIUM 80 MG/1
TABLET, FILM COATED ORAL
Qty: 15 TABLET | Refills: 0 | Status: SHIPPED | OUTPATIENT
Start: 2019-01-25 | End: 2019-02-12

## 2019-01-25 NOTE — TELEPHONE ENCOUNTER
"Routing refill request to provider for review/approval because:  Abigail given x1 and patient did not follow up, please advise  Patient needs to be seen because it has been more than 1 year since last office visit (12/17).      Requested Prescriptions   Pending Prescriptions Disp Refills     atorvastatin (LIPITOR) 80 MG tablet [Pharmacy Med Name: ATORVASTATIN 80MG TABLETS] 15 tablet 0    Last Written Prescription Date:  11/28/2018  Last Fill Quantity: 15,  # refills: 0   Last office visit: 12/13/2017 with prescribing provider:  Brock   Future Office Visit:   Next 5 appointments (look out 90 days)    Feb 12, 2019 10:00 AM CST  PHYSICAL with Anthony Gutiérrez MD  [facility] [address] [place] [id number] [phone]          Sig: TABLET 1/2 TABLET BY MOUTH DAILY    Statins Protocol Failed - 1/25/2019  9:07 AM       Failed - LDL on file in past 12 months    Recent Labs   Lab Test 12/13/17  1443   LDL 91            Passed - No abnormal creatine kinase in past 12 months    Recent Labs   Lab Test 12/13/17  1443                  Passed - Recent (12 mo) or future (30 days) visit within the authorizing provider's specialty    Patient had office visit in the last 12 months or has a visit in the next 30 days with authorizing provider or within the authorizing provider's specialty.  See \"Patient Info\" tab in inbasket, or \"Choose Columns\" in Meds & Orders section of the refill encounter.             Passed - Medication is active on med list       Passed - Patient is age 18 or older        MOISE Simmons, RN  [facility]            "

## 2019-02-12 ENCOUNTER — OFFICE VISIT (OUTPATIENT)
Dept: FAMILY MEDICINE | Facility: CLINIC | Age: 49
End: 2019-02-12
Payer: COMMERCIAL

## 2019-02-12 VITALS
BODY MASS INDEX: 30.49 KG/M2 | HEART RATE: 75 BPM | SYSTOLIC BLOOD PRESSURE: 124 MMHG | HEIGHT: 70 IN | WEIGHT: 213 LBS | OXYGEN SATURATION: 95 % | TEMPERATURE: 97.9 F | DIASTOLIC BLOOD PRESSURE: 72 MMHG

## 2019-02-12 DIAGNOSIS — Z51.81 MEDICATION MONITORING ENCOUNTER: ICD-10-CM

## 2019-02-12 DIAGNOSIS — F32.0 MAJOR DEPRESSIVE DISORDER, SINGLE EPISODE, MILD (H): ICD-10-CM

## 2019-02-12 DIAGNOSIS — I10 HYPERTENSION GOAL BP (BLOOD PRESSURE) < 140/90: ICD-10-CM

## 2019-02-12 DIAGNOSIS — F41.9 ANXIETY: ICD-10-CM

## 2019-02-12 DIAGNOSIS — Z00.00 ENCOUNTER FOR ROUTINE ADULT HEALTH EXAMINATION WITHOUT ABNORMAL FINDINGS: Primary | ICD-10-CM

## 2019-02-12 DIAGNOSIS — Z12.5 SCREENING FOR PROSTATE CANCER: ICD-10-CM

## 2019-02-12 DIAGNOSIS — Z12.11 SPECIAL SCREENING FOR MALIGNANT NEOPLASMS, COLON: ICD-10-CM

## 2019-02-12 DIAGNOSIS — E78.5 HYPERLIPIDEMIA LDL GOAL <130: ICD-10-CM

## 2019-02-12 DIAGNOSIS — Z82.49 FAMILY HISTORY OF CORONARY ARTERY DISEASE: ICD-10-CM

## 2019-02-12 LAB
ALBUMIN SERPL-MCNC: 4.1 G/DL (ref 3.4–5)
ALBUMIN UR-MCNC: NEGATIVE MG/DL
ALP SERPL-CCNC: 104 U/L (ref 40–150)
ALT SERPL W P-5'-P-CCNC: 40 U/L (ref 0–70)
ANION GAP SERPL CALCULATED.3IONS-SCNC: 5 MMOL/L (ref 3–14)
APPEARANCE UR: CLEAR
AST SERPL W P-5'-P-CCNC: 26 U/L (ref 0–45)
BILIRUB SERPL-MCNC: 0.6 MG/DL (ref 0.2–1.3)
BILIRUB UR QL STRIP: NEGATIVE
BUN SERPL-MCNC: 15 MG/DL (ref 7–30)
CALCIUM SERPL-MCNC: 9.1 MG/DL (ref 8.5–10.1)
CHLORIDE SERPL-SCNC: 107 MMOL/L (ref 94–109)
CHOLEST SERPL-MCNC: 134 MG/DL
CK SERPL-CCNC: 190 U/L (ref 30–300)
CO2 SERPL-SCNC: 27 MMOL/L (ref 20–32)
COLOR UR AUTO: YELLOW
CREAT SERPL-MCNC: 0.92 MG/DL (ref 0.66–1.25)
CREAT UR-MCNC: 182 MG/DL
ERYTHROCYTE [DISTWIDTH] IN BLOOD BY AUTOMATED COUNT: 12.4 % (ref 10–15)
GFR SERPL CREATININE-BSD FRML MDRD: >90 ML/MIN/{1.73_M2}
GLUCOSE SERPL-MCNC: 97 MG/DL (ref 70–99)
GLUCOSE UR STRIP-MCNC: NEGATIVE MG/DL
HBA1C MFR BLD: 5.6 % (ref 0–5.6)
HCT VFR BLD AUTO: 43.6 % (ref 40–53)
HDLC SERPL-MCNC: 41 MG/DL
HGB BLD-MCNC: 15.1 G/DL (ref 13.3–17.7)
HGB UR QL STRIP: NEGATIVE
KETONES UR STRIP-MCNC: NEGATIVE MG/DL
LDLC SERPL CALC-MCNC: 69 MG/DL
LEUKOCYTE ESTERASE UR QL STRIP: NEGATIVE
MCH RBC QN AUTO: 30.9 PG (ref 26.5–33)
MCHC RBC AUTO-ENTMCNC: 34.6 G/DL (ref 31.5–36.5)
MCV RBC AUTO: 89 FL (ref 78–100)
MICROALBUMIN UR-MCNC: 14 MG/L
MICROALBUMIN/CREAT UR: 7.69 MG/G CR (ref 0–17)
NITRATE UR QL: NEGATIVE
NONHDLC SERPL-MCNC: 93 MG/DL
PH UR STRIP: 6 PH (ref 5–7)
PLATELET # BLD AUTO: 270 10E9/L (ref 150–450)
POTASSIUM SERPL-SCNC: 4 MMOL/L (ref 3.4–5.3)
PROT SERPL-MCNC: 7.7 G/DL (ref 6.8–8.8)
RBC # BLD AUTO: 4.89 10E12/L (ref 4.4–5.9)
SODIUM SERPL-SCNC: 139 MMOL/L (ref 133–144)
SOURCE: NORMAL
SP GR UR STRIP: 1.02 (ref 1–1.03)
TRIGL SERPL-MCNC: 118 MG/DL
TSH SERPL DL<=0.005 MIU/L-ACNC: 1.56 MU/L (ref 0.4–4)
UROBILINOGEN UR STRIP-ACNC: 0.2 EU/DL (ref 0.2–1)
WBC # BLD AUTO: 5.6 10E9/L (ref 4–11)

## 2019-02-12 PROCEDURE — 83036 HEMOGLOBIN GLYCOSYLATED A1C: CPT | Performed by: FAMILY MEDICINE

## 2019-02-12 PROCEDURE — 80061 LIPID PANEL: CPT | Performed by: FAMILY MEDICINE

## 2019-02-12 PROCEDURE — 84443 ASSAY THYROID STIM HORMONE: CPT | Performed by: FAMILY MEDICINE

## 2019-02-12 PROCEDURE — 36415 COLL VENOUS BLD VENIPUNCTURE: CPT | Performed by: FAMILY MEDICINE

## 2019-02-12 PROCEDURE — G0103 PSA SCREENING: HCPCS | Performed by: FAMILY MEDICINE

## 2019-02-12 PROCEDURE — 99396 PREV VISIT EST AGE 40-64: CPT | Performed by: FAMILY MEDICINE

## 2019-02-12 PROCEDURE — 82043 UR ALBUMIN QUANTITATIVE: CPT | Performed by: FAMILY MEDICINE

## 2019-02-12 PROCEDURE — 85027 COMPLETE CBC AUTOMATED: CPT | Performed by: FAMILY MEDICINE

## 2019-02-12 PROCEDURE — 80053 COMPREHEN METABOLIC PANEL: CPT | Performed by: FAMILY MEDICINE

## 2019-02-12 PROCEDURE — 82550 ASSAY OF CK (CPK): CPT | Performed by: FAMILY MEDICINE

## 2019-02-12 PROCEDURE — 81003 URINALYSIS AUTO W/O SCOPE: CPT | Performed by: FAMILY MEDICINE

## 2019-02-12 RX ORDER — LISINOPRIL AND HYDROCHLOROTHIAZIDE 12.5; 2 MG/1; MG/1
1 TABLET ORAL DAILY
Qty: 90 TABLET | Refills: 3 | Status: SHIPPED | OUTPATIENT
Start: 2019-02-12 | End: 2020-02-18

## 2019-02-12 RX ORDER — ATORVASTATIN CALCIUM 80 MG/1
TABLET, FILM COATED ORAL
Qty: 45 TABLET | Refills: 3 | Status: SHIPPED | OUTPATIENT
Start: 2019-02-12 | End: 2020-01-28

## 2019-02-12 ASSESSMENT — ANXIETY QUESTIONNAIRES
2. NOT BEING ABLE TO STOP OR CONTROL WORRYING: NOT AT ALL
7. FEELING AFRAID AS IF SOMETHING AWFUL MIGHT HAPPEN: NOT AT ALL
3. WORRYING TOO MUCH ABOUT DIFFERENT THINGS: NOT AT ALL
1. FEELING NERVOUS, ANXIOUS, OR ON EDGE: NOT AT ALL
6. BECOMING EASILY ANNOYED OR IRRITABLE: NOT AT ALL
5. BEING SO RESTLESS THAT IT IS HARD TO SIT STILL: NOT AT ALL
GAD7 TOTAL SCORE: 0

## 2019-02-12 ASSESSMENT — MIFFLIN-ST. JEOR: SCORE: 1837.41

## 2019-02-12 ASSESSMENT — PATIENT HEALTH QUESTIONNAIRE - PHQ9
5. POOR APPETITE OR OVEREATING: NOT AT ALL
SUM OF ALL RESPONSES TO PHQ QUESTIONS 1-9: 0

## 2019-02-12 NOTE — PATIENT INSTRUCTIONS
Prescriptions refilled today.    [facility] [place]                        To reach your care team during and after hours:   [phone]  To reach our pharmacy:        [phone]    Clinic Hours                        Our clinic hours are:    Monday   7:30 am to 7:00 pm                  Tuesday through Friday 7:30 am to 5:00 pm                             Saturday   8:00 am to 12:00 pm      Sunday   Closed      Pharmacy Hours                        Our pharmacy hours are:    Monday   8:30 am to 7:00 pm       Tuesday to Friday  8:30 am to 6:00 pm                       Saturday    9:00 am to 1:00 pm              Sunday    Closed              There is also information available at our web site:  [email or web address]    If your provider ordered any lab tests and you do not receive the results within 10 business days, please call the clinic.    If you need a medication refill please contact your pharmacy.  Please allow 2-3 business days for your refill to be completed.    Our clinic offers telephone visits and e visits.  Please ask one of your team members to explain more.      Use [employer] (secure email communication and access to your chart) to send your primary care provider a message or make an appointment. Ask someone on your Team how to sign up for [employer].  Immunizations                      Immunization History   Administered Date(s) Administered     Influenza (IIV3) PF 11/01/2009, 10/01/2011, 10/01/2015     Pneumococcal 23 valent 01/18/2005     TD (ADULT, 7+) 01/18/2005     TDAP Vaccine (Boostrix) 03/02/2012     Twinrix A/B 01/18/2005, 07/12/2006, 02/04/2009        Health Maintenance                         Health Maintenance Due   Topic Date Due     Colon Cancer Screening - FIT Test - yearly  09/29/2015     Depression Assessment - every 6 months  06/13/2018     Basic Metabolic Lab - yearly  12/13/2018     Cholesterol Lab - yearly  12/13/2018     Microalbumin Lab - yearly  12/13/2018     Prostate Test  (PSA) - yearly  12/13/2018     Wellness Visit with your Primary Provider - yearly  12/13/2018       Preventive Health Recommendations  Male Ages 40 to 49    Yearly exam:             See your health care provider every year in order to  o   Review health changes.   o   Discuss preventive care.    o   Review your medicines if your doctor has prescribed any.    You should be tested each year for STDs (sexually transmitted diseases) if you re at risk.     Have a cholesterol test every 5 years.     Have a colonoscopy (test for colon cancer) if someone in your family has had colon cancer or polyps before age 50.     After age 45, have a diabetes test (fasting glucose). If you are at risk for diabetes, you should have this test every 3 years.      Talk with your health care provider about whether or not a prostate cancer screening test (PSA) is right for you.    Shots: Get a flu shot each year. Get a tetanus shot every 10 years.     Nutrition:    Eat at least 5 servings of fruits and vegetables daily.     Eat whole-grain bread, whole-wheat pasta and brown rice instead of white grains and rice.     Get adequate Calcium and Vitamin D.     Lifestyle    Exercise for at least 150 minutes a week (30 minutes a day, 5 days a week). This will help you control your weight and prevent disease.     Limit alcohol to one drink per day.     No smoking.     Wear sunscreen to prevent skin cancer.     See your dentist every six months for an exam and cleaning.

## 2019-02-12 NOTE — PROGRESS NOTES
SUBJECTIVE:   CC: Obdulio Wallace is an 49 year old male who presents for preventative health visit.     Physical   Annual:     Getting at least 3 servings of Calcium per day:  Yes    Bi-annual eye exam:  Yes    Dental care twice a year:  Yes    Sleep apnea or symptoms of sleep apnea:  None    Diet:  Regular (no restrictions)    Frequency of exercise:  4-5 days/week    Duration of exercise:  45-60 minutes    Taking medications regularly:  Yes    Medication side effects:  None    Additional concerns today:  No    PHQ-2 Total Score: 0    Hyperlipidemia: Patient's hyperlipidemia is well controlled. Patient is currently prescribed 40 mg Atorvastatin daily for hyperlipidemia management.    Recent Labs   Lab Test 12/13/17  1443 09/09/16  1120 08/18/15  0905 06/27/14  0801   CHOL 169 217* 161 199   HDL 49 38* 39* 38*   LDL 91 129* 91 130*   TRIG 147 251* 157* 153*   CHOLHDLRATIO  --   --  4.1 5.2*     Hypertension: Patient presents today as normotensive. Patient is currently prescribed 20-12.5 mg Lisinopril-hydrochlorothiazide daily for hypertension management.    BP Readings from Last 5 Encounters:   02/12/19 124/72   12/13/17 112/74   09/09/16 114/74   08/18/15 118/68   09/29/14 130/88     Creatinine   Date Value Ref Range Status   02/12/2019 0.92 0.66 - 1.25 mg/dL Final     Depression/Anxiety: Patient had a PHQ9 score of 0 and a GAD7 score of 0 today. Patient's depression/anxiety is well controlled. Patient is currently prescribed 50 mg Zoloft daily for depression/anxiety management.    Today's PHQ-2 Score:   PHQ-2 ( 1999 Pfizer) 2/11/2019   Q1: Little interest or pleasure in doing things 0   Q2: Feeling down, depressed or hopeless 0   PHQ-2 Score 0   Q1: Little interest or pleasure in doing things Not at all   Q2: Feeling down, depressed or hopeless Not at all   PHQ-2 Score 0       Abuse: Current or Past(Physical, Sexual or Emotional)- No  Do you feel safe in your environment? Yes    Social History     Tobacco Use      Smoking status: Never Smoker     Smokeless tobacco: Never Used   Substance Use Topics     Alcohol use: Yes     Alcohol/week: 2.4 - 3.6 oz     Types: 4 - 6 Standard drinks or equivalent per week     Comment: 4-6 per week avg     Alcohol Use 2/11/2019   If you drink alcohol do you typically have greater than 3 drinks per day OR greater than 7 drinks per week? No       Last PSA:   PSA   Date Value Ref Range Status   02/12/2019 0.56 0 - 4 ug/L Final     Comment:     Assay Method:  Chemiluminescence using Siemens Vista analyzer       Reviewed orders with patient. Reviewed health maintenance and updated orders accordingly - Yes  Labs reviewed in EPIC    Reviewed and updated as needed this visit by clinical staff  Tobacco  Allergies  Meds  Med Hx  Surg Hx  Fam Hx  Soc Hx      Reviewed and updated as needed this visit by Provider  Allergies        Health Maintenance     Colonoscopy:  At age 50   FIT:  Last 9/29/2014, Due              PSA:  Last 12/13/2017, Due   DEXA:  N/A    Health Maintenance Due   Topic Date Due     FIT Q1 YR  09/29/2015     WELLNESS VISIT Q1 YR  12/13/2018       Current Problem List    Patient Active Problem List   Diagnosis     Hyperlipidemia LDL goal <130     Hypertension goal BP (blood pressure) < 140/90     Major depressive disorder, single episode, mild (H)     Anxiety     Family history of coronary artery disease       Past Medical History    Past Medical History:   Diagnosis Date     Anxiety 1/14     Hyperlipidemia LDL goal <130 2001     Hypertension goal BP (blood pressure) < 140/90 2001     Major depressive disorder, single episode, mild (H) 1/14     Mild intermittent asthma childhood    resolved       Past Surgical History    Past Surgical History:   Procedure Laterality Date     C LASIK (EMP) W OPT MYOPIA P1  2004     STRESS ECHO (METRO)  1/09, 10/15    normal except increased bp     SURGICAL HISTORY OF -   2001    jaw realignment - misbite       Current Medications    Current  Outpatient Medications   Medication Sig Dispense Refill     ADVIL 200 MG OR TABS 1 TABLET EVERY 4 TO 6 HOURS AS NEEDED 120 0     ASPIRIN 81 MG OR TABS ONE DAILY 100 3     atorvastatin (LIPITOR) 80 MG tablet TABLET 1/2 TABLET BY MOUTH DAILY 45 tablet 3     EXCEDRIN 250-250-65 MG OR TABS 2 TABLETS EVERY 6 HOURS AS NEEDED 80 0     hydrocortisone (WESTCORT) 0.2 % cream Apply sparingly to affected area three times daily for 14 days. 15 g 1     lisinopril-hydrochlorothiazide (PRINZIDE/ZESTORETIC) 20-12.5 MG tablet Take 1 tablet by mouth daily 90 tablet 3     sertraline (ZOLOFT) 50 MG tablet Take 1 tablet (50 mg) by mouth daily 90 tablet 3       Allergies    No Known Allergies    Immunizations    Immunization History   Administered Date(s) Administered     Influenza (IIV3) PF 2009, 10/01/2011, 10/01/2015     Pneumococcal 23 valent 2005     TD (ADULT, 7+) 2005     TDAP Vaccine (Boostrix) 2012     Twinrix A/B 2005, 2006, 2009       Family History    Family History   Problem Relation Age of Onset     Hypertension Mother      Lipids Mother      C.A.D. Mother      Hypertension Father          at age 63     Lipids Father         triglycerides     C.A.D. Father      C.A.D. Maternal Grandmother      Lung Cancer Maternal Grandfather         smoker     Chronic Obstructive Pulmonary Disease Paternal Grandmother         smoker       Social History    Social History     Socioeconomic History     Marital status:      Spouse name: Nicolasa     Number of children: 0     Years of education: 16     Highest education level: Not on file   Social Needs     Financial resource strain: Not on file     Food insecurity - worry: Not on file     Food insecurity - inability: Not on file     Transportation needs - medical: Not on file     Transportation needs - non-medical: Not on file   Occupational History     Employer: [employer]   Tobacco Use     Smoking status: Never Smoker      "Smokeless tobacco: Never Used   Substance and Sexual Activity     Alcohol use: Yes     Alcohol/week: 2.4 - 3.6 oz     Types: 4 - 6 Standard drinks or equivalent per week     Comment: 4-6 per week avg     Drug use: No     Sexual activity: Yes     Partners: Female   Other Topics Concern      Service Not Asked     Blood Transfusions Not Asked     Caffeine Concern Yes     Comment: 2 cups qd     Occupational Exposure Not Asked     Hobby Hazards Not Asked     Sleep Concern Not Asked     Stress Concern Not Asked     Weight Concern Not Asked     Special Diet Not Asked     Back Care Not Asked     Exercise Yes     Comment: 3-4 times per week, walks 6-7 times per week     Bike Helmet Not Asked     Seat Belt Yes     Self-Exams Not Asked     Parent/sibling w/ CABG, MI or angioplasty before 65F 55M? Yes   Social History Narrative     Not on file       Review of Systems  Constitutional, HEENT, cardiovascular, pulmonary, GI, , musculoskeletal, neuro, skin, endocrine and psych systems are negative, except as otherwise noted.    OBJECTIVE:   /72   Pulse 75   Temp 97.9  F (36.6  C) (Oral)   Ht 1.778 m (5' 10\")   Wt 96.6 kg (213 lb)   SpO2 95%   BMI 30.56 kg/m      Physical Exam  GENERAL: healthy, alert and no distress  EYES: Eyes grossly normal to inspection  HENT:ear canals and TM's normal upon viewing with otoscope, nose and mouth without ulcers or lesions upon viewing with otoscope  NECK: no adenopathy, no asymmetry, masses, or scars and thyroid normal to palpation  RESP: lungs clear to auscultation - no rales, rhonchi or wheezes  CV: regular rate and rhythm, normal S1 S2, no S3 or S4, no murmur, click or rub, no peripheral edema and peripheral pulses strong  ABDOMEN: soft, nontender, no hepatosplenomegaly, no masses and bowel sounds normal   (male): normal male genitalia without lesions or urethral discharge, no hernia  RECTAL: normal sphincter tone, no rectal masses, prostate normal size, smooth, nontender " without nodules or masses  MS: no gross musculoskeletal defects noted, no edema  SKIN: no suspicious lesions or rashes  NEURO: Normal strength and tone, mentation intact and speech normal  PSYCH: mentation appears normal, affect normal/bright  BACK: no CVA tenderness, no paralumbar tenderness    Diagnostic Test Results:  Results for orders placed or performed in visit on 02/12/19 (from the past 24 hour(s))   Hemoglobin A1c   Result Value Ref Range    Hemoglobin A1C 5.6 0 - 5.6 %   CBC with platelets   Result Value Ref Range    WBC 5.6 4.0 - 11.0 10e9/L    RBC Count 4.89 4.4 - 5.9 10e12/L    Hemoglobin 15.1 13.3 - 17.7 g/dL    Hematocrit 43.6 40.0 - 53.0 %    MCV 89 78 - 100 fl    MCH 30.9 26.5 - 33.0 pg    MCHC 34.6 31.5 - 36.5 g/dL    RDW 12.4 10.0 - 15.0 %    Platelet Count 270 150 - 450 10e9/L   Comprehensive metabolic panel   Result Value Ref Range    Sodium 139 133 - 144 mmol/L    Potassium 4.0 3.4 - 5.3 mmol/L    Chloride 107 94 - 109 mmol/L    Carbon Dioxide 27 20 - 32 mmol/L    Anion Gap 5 3 - 14 mmol/L    Glucose 97 70 - 99 mg/dL    Urea Nitrogen 15 7 - 30 mg/dL    Creatinine 0.92 0.66 - 1.25 mg/dL    GFR Estimate >90 >60 mL/min/[1.73_m2]    GFR Estimate If Black >90 >60 mL/min/[1.73_m2]    Calcium 9.1 8.5 - 10.1 mg/dL    Bilirubin Total 0.6 0.2 - 1.3 mg/dL    Albumin 4.1 3.4 - 5.0 g/dL    Protein Total 7.7 6.8 - 8.8 g/dL    Alkaline Phosphatase 104 40 - 150 U/L    ALT 40 0 - 70 U/L    AST 26 0 - 45 U/L   Lipid panel reflex to direct LDL Fasting   Result Value Ref Range    Cholesterol 134 <200 mg/dL    Triglycerides 118 <150 mg/dL    HDL Cholesterol 41 >39 mg/dL    LDL Cholesterol Calculated 69 <100 mg/dL    Non HDL Cholesterol 93 <130 mg/dL   Prostate spec antigen screen   Result Value Ref Range    PSA 0.56 0 - 4 ug/L   CK total   Result Value Ref Range    CK Total 190 30 - 300 U/L   TSH with free T4 reflex   Result Value Ref Range    TSH 1.56 0.40 - 4.00 mU/L   Albumin Random Urine Quantitative with Creat  Ratio   Result Value Ref Range    Creatinine Urine 182 mg/dL    Albumin Urine mg/L 14 mg/L    Albumin Urine mg/g Cr 7.69 0 - 17 mg/g Cr   UA reflex to Microscopic and Culture   Result Value Ref Range    Color Urine Yellow     Appearance Urine Clear     Glucose Urine Negative NEG^Negative mg/dL    Bilirubin Urine Negative NEG^Negative    Ketones Urine Negative NEG^Negative mg/dL    Specific Gravity Urine 1.020 1.003 - 1.035    Blood Urine Negative NEG^Negative    pH Urine 6.0 5.0 - 7.0 pH    Protein Albumin Urine Negative NEG^Negative mg/dL    Urobilinogen Urine 0.2 0.2 - 1.0 EU/dL    Nitrite Urine Negative NEG^Negative    Leukocyte Esterase Urine Negative NEG^Negative    Source Midstream Urine        ASSESSMENT/PLAN:       ICD-10-CM    1. Encounter for routine adult health examination without abnormal findings Z00.00 Albumin Random Urine Quantitative with Creat Ratio     Hemoglobin A1c     CBC with platelets     Comprehensive metabolic panel     Lipid panel reflex to direct LDL Fasting     Prostate spec antigen screen     CK total     TSH with free T4 reflex     Fecal colorectal cancer screen FIT     UA reflex to Microscopic and Culture   2. Hypertension goal BP (blood pressure) < 140/90 I10 lisinopril-hydrochlorothiazide (PRINZIDE/ZESTORETIC) 20-12.5 MG tablet   3. Hyperlipidemia LDL goal <130 E78.5 atorvastatin (LIPITOR) 80 MG tablet   4. Family history of coronary artery disease Z82.49    5. Major depressive disorder, single episode, mild (H) F32.0 sertraline (ZOLOFT) 50 MG tablet   6. Anxiety F41.9 sertraline (ZOLOFT) 50 MG tablet   7. Screening for prostate cancer Z12.5 Prostate spec antigen screen   8. Special screening for malignant neoplasms, colon Z12.11 Fecal colorectal cancer screen FIT   9. Medication monitoring encounter Z51.81      Discussed treatment/modality options, including risk and benefits, he desires advised aspirin 81 mg po daily, advised 1 multivitamin per day, advised dentist every 6 months,  "advised diet and exercise and advised opthalmologist every 1-2 years. All diagnosis above reviewed and noted above, otherwise stable.  See [facility] orders for further details.      1) Patient's hyperlipidemia is well controlled. Patient is currently prescribed 40 mg Atorvastatin daily for hyperlipidemia management. Advised continued use.    2) Patient presents today as normotensive. Patient is currently prescribed 20-12.5 mg Lisinopril-hydrochlorothiazide daily for hypertension management. Advised continued use.    3) Patient had a PHQ9 score of 0 and a GAD7 score of 0 today. Patient's depression/anxiety is well controlled. Patient is currently prescribed 50 mg Zoloft daily for depression/anxiety management. Advised continued use.    4) Prescriptions refilled today.    5) Recommend flu shot.     6) Follow up in 1 year for complete physical exam.    Health Maintenance Due   Topic Date Due     FIT Q1 YR  09/29/2015     WELLNESS VISIT Q1 YR  12/13/2018       COUNSELING:   Reviewed preventive health counseling, as reflected in patient instructions    BP Readings from Last 1 Encounters:   02/12/19 124/72     Estimated body mass index is 30.56 kg/m  as calculated from the following:    Height as of this encounter: 1.778 m (5' 10\").    Weight as of this encounter: 96.6 kg (213 lb).     reports that  has never smoked. he has never used smokeless tobacco.    Counseling Resources:  ATP IV Guidelines  Pooled Cohorts Equation Calculator  FRAX Risk Assessment  ICSI Preventive Guidelines  Dietary Guidelines for Americans, 2010  USDA's MyPlate  ASA Prophylaxis  Lung CA Screening    This document serves as a record of the services and decisions personally performed and made by Anthony Gutiérrez MD. It was created on his behalf by Williams Hernández, a trained medical scribe. The creation of this document is based on the provider's statements to the medical scribe.  Williams Hernández February 12, 2019 10:39 AM     The information in this document, created " by the medical scribe for me, accurately reflects the services I personally performed and the decisions made by me. I have reviewed and approved this document for accuracy prior to leaving the patient care area.  February 12, 2019          Anthony Gutiérrez MD, [address]  [address] [phone] Fax

## 2019-02-12 NOTE — LETTER
[facility]  [address] [place] [id number]                  [phone]   February 14, 2019    Obdulio Wallace  [address] [facility] [address]      Dear Obdulio,    Here is a summary of your recent test results:    Labs are overall very good.     We advise:     Continue current cares.   Balanced low cholesterol diet.   Regular exercise.     Your test results are enclosed.      Please contact me if you have any questions.    In addition, here is a list of due or overdue Health Maintenance reminders.    Health Maintenance Due   Topic Date Due     Colon Cancer Screening - FIT Test - yearly  09/29/2015       Please call us at [phone] (or use [employer]) to address the above recommendations.      Thank you very much for trusting [facility]..     Healthy regards,        Anthony Gutiérrez M.D.        Results for orders placed or performed in visit on 02/12/19   Albumin Random Urine Quantitative with Creat Ratio   Result Value Ref Range    Creatinine Urine 182 mg/dL    Albumin Urine mg/L 14 mg/L    Albumin Urine mg/g Cr 7.69 0 - 17 mg/g Cr   Hemoglobin A1c   Result Value Ref Range    Hemoglobin A1C 5.6 0 - 5.6 %   CBC with platelets   Result Value Ref Range    WBC 5.6 4.0 - 11.0 10e9/L    RBC Count 4.89 4.4 - 5.9 10e12/L    Hemoglobin 15.1 13.3 - 17.7 g/dL    Hematocrit 43.6 40.0 - 53.0 %    MCV 89 78 - 100 fl    MCH 30.9 26.5 - 33.0 pg    MCHC 34.6 31.5 - 36.5 g/dL    RDW 12.4 10.0 - 15.0 %    Platelet Count 270 150 - 450 10e9/L   Comprehensive metabolic panel   Result Value Ref Range    Sodium 139 133 - 144 mmol/L    Potassium 4.0 3.4 - 5.3 mmol/L    Chloride 107 94 - 109 mmol/L    Carbon Dioxide 27 20 - 32 mmol/L    Anion Gap 5 3 - 14 mmol/L    Glucose 97 70 - 99 mg/dL    Urea Nitrogen 15 7 - 30 mg/dL    Creatinine 0.92 0.66 - 1.25 mg/dL    GFR Estimate >90 >60 mL/min/[1.73_m2]    GFR Estimate If Black >90 >60 mL/min/[1.73_m2]    Calcium 9.1 8.5 -  10.1 mg/dL    Bilirubin Total 0.6 0.2 - 1.3 mg/dL    Albumin 4.1 3.4 - 5.0 g/dL    Protein Total 7.7 6.8 - 8.8 g/dL    Alkaline Phosphatase 104 40 - 150 U/L    ALT 40 0 - 70 U/L    AST 26 0 - 45 U/L   Lipid panel reflex to direct LDL Fasting   Result Value Ref Range    Cholesterol 134 <200 mg/dL    Triglycerides 118 <150 mg/dL    HDL Cholesterol 41 >39 mg/dL    LDL Cholesterol Calculated 69 <100 mg/dL    Non HDL Cholesterol 93 <130 mg/dL   Prostate spec antigen screen   Result Value Ref Range    PSA 0.56 0 - 4 ug/L   CK total   Result Value Ref Range    CK Total 190 30 - 300 U/L   TSH with free T4 reflex   Result Value Ref Range    TSH 1.56 0.40 - 4.00 mU/L   UA reflex to Microscopic and Culture   Result Value Ref Range    Color Urine Yellow     Appearance Urine Clear     Glucose Urine Negative NEG^Negative mg/dL    Bilirubin Urine Negative NEG^Negative    Ketones Urine Negative NEG^Negative mg/dL    Specific Gravity Urine 1.020 1.003 - 1.035    Blood Urine Negative NEG^Negative    pH Urine 6.0 5.0 - 7.0 pH    Protein Albumin Urine Negative NEG^Negative mg/dL    Urobilinogen Urine 0.2 0.2 - 1.0 EU/dL    Nitrite Urine Negative NEG^Negative    Leukocyte Esterase Urine Negative NEG^Negative    Source Midstream Urine

## 2019-02-13 LAB — PSA SERPL-ACNC: 0.56 UG/L (ref 0–4)

## 2019-02-13 ASSESSMENT — ANXIETY QUESTIONNAIRES: GAD7 TOTAL SCORE: 0

## 2020-01-27 DIAGNOSIS — E78.5 HYPERLIPIDEMIA LDL GOAL <130: ICD-10-CM

## 2020-01-27 NOTE — TELEPHONE ENCOUNTER
"Requested Prescriptions   Pending Prescriptions Disp Refills     atorvastatin (LIPITOR) 80 MG tablet [Pharmacy Med Name: ATORVASTATIN 80MG TABLETS].        Last Written Prescription Date:  2.12.19  Last Fill Quantity: 45 tablet,  # refills: 3   Last office visit: 2/12/2019 with prescribing provider:  Anthony Gutiérrez MD         Future Office Visit:       45 tablet 3     Sig: TAKE 1/2 TABLET BY MOUTH DAILY       Statins Protocol Passed - 1/27/2020  4:13 AM        Passed - LDL on file in past 12 months     Recent Labs   Lab Test 02/12/19  0940   LDL 69             Passed - No abnormal creatine kinase in past 12 months     Recent Labs   Lab Test 02/12/19  0940                   Passed - Recent (12 mo) or future (30 days) visit within the authorizing provider's specialty     Patient has had an office visit with the authorizing provider or a provider within the authorizing providers department within the previous 12 mos or has a future within next 30 days. See \"Patient Info\" tab in inbasket, or \"Choose Columns\" in Meds & Orders section of the refill encounter.              Passed - Medication is active on med list        Passed - Patient is age 18 or older        "

## 2020-01-28 RX ORDER — ATORVASTATIN CALCIUM 80 MG/1
TABLET, FILM COATED ORAL
Qty: 30 TABLET | Refills: 0 | Status: SHIPPED | OUTPATIENT
Start: 2020-01-28 | End: 2020-03-20

## 2020-01-28 NOTE — TELEPHONE ENCOUNTER
Refill given, need future appt for further refills    Codey Penaloza RN   [facility]

## 2020-02-08 DIAGNOSIS — I10 HYPERTENSION GOAL BP (BLOOD PRESSURE) < 140/90: ICD-10-CM

## 2020-02-08 DIAGNOSIS — F32.0 MAJOR DEPRESSIVE DISORDER, SINGLE EPISODE, MILD (H): ICD-10-CM

## 2020-02-08 DIAGNOSIS — F41.9 ANXIETY: ICD-10-CM

## 2020-02-08 NOTE — TELEPHONE ENCOUNTER
"Requested Prescriptions   Pending Prescriptions Disp Refills     sertraline (ZOLOFT) 50 MG tablet [Pharmacy Med Name: SERTRALINE 50MG  Last Written Prescription Date:  2/12/19  Last Fill Quantity: 90,  # refills: 3   Last Office Visit: 2/12/2019   Future Office Visit:      TABLETS] 90 tablet 3     Sig: TAKE 1 TABLET(50 MG) BY MOUTH DAILY       SSRIs Protocol Failed - 2/8/2020  4:14 AM   PHQ-9 SCORE 9/9/2016 12/13/2017 2/12/2019   PHQ-9 Total Score - - -   PHQ-9 Total Score MyChart - - -   PHQ-9 Total Score 1 0 0     ABI-7 SCORE 9/9/2016 12/13/2017 2/12/2019   Total Score - - -   Total Score 1 0 0            Failed - PHQ-9 score less than 5 in past 6 months     Please review last PHQ-9 score.           Failed - Recent (6 mo) or future (30 days) visit within the authorizing provider's specialty     Patient had office visit in the last 6 months or has a visit in the next 30 days with authorizing provider or within the authorizing provider's specialty.  See \"Patient Info\" tab in inbasket, or \"Choose Columns\" in Meds & Orders section of the refill encounter.            Passed - Medication is active on med list        Passed - Patient is age 18 or older        lisinopril-hydrochlorothiazide (PRINZIDE/ZESTORETIC) 20-12.5 MG tablet  Last Written Prescription Date:  2/12/19  Last Fill Quantity: 90,  # refills: 3   Last Office Visit: 2/12/2019   Future Office Visit:      [Pharmacy Med Name: LISINOPRIL-HCTZ 20/12.5MG TABLETS] 90 tablet 3     Sig: TAKE 1 TABLET BY MOUTH DAILY       Diuretics (Including Combos) Protocol Passed - 2/8/2020  4:14 AM        Passed - Blood pressure under 140/90 in past 12 months     BP Readings from Last 3 Encounters:   02/12/19 124/72   12/13/17 112/74   09/09/16 114/74           Passed - Recent (12 mo) or future (30 days) visit within the authorizing provider's specialty     Patient has had an office visit with the authorizing provider or a provider within the authorizing providers department " "within the previous 12 mos or has a future within next 30 days. See \"Patient Info\" tab in inbasket, or \"Choose Columns\" in Meds & Orders section of the refill encounter.            Passed - Medication is active on med list        Passed - Patient is age 18 or older        Passed - Normal serum creatinine on file in past 12 months     Recent Labs   Lab Test 02/12/19  0940   CR 0.92            Passed - Normal serum potassium on file in past 12 months     Recent Labs   Lab Test 02/12/19  0940   POTASSIUM 4.0            Passed - Normal serum sodium on file in past 12 months     Recent Labs   Lab Test 02/12/19  0940                 "

## 2020-02-10 NOTE — TELEPHONE ENCOUNTER
mychart message sent.     Pt needs updated PHQ/ABI and an OV appt. Set up.    Codey Penaloza RN   [facility]

## 2020-02-12 NOTE — TELEPHONE ENCOUNTER
Left message on answering machine for patient to call back.    Needs appointment scheduled and to log into [employer] for questionnaires    [facility]  [phone]

## 2020-02-18 ENCOUNTER — TELEPHONE (OUTPATIENT)
Dept: FAMILY MEDICINE | Facility: CLINIC | Age: 50
End: 2020-02-18

## 2020-02-18 DIAGNOSIS — I10 HYPERTENSION GOAL BP (BLOOD PRESSURE) < 140/90: ICD-10-CM

## 2020-02-18 DIAGNOSIS — F41.9 ANXIETY: ICD-10-CM

## 2020-02-18 DIAGNOSIS — R21 RASH: ICD-10-CM

## 2020-02-18 DIAGNOSIS — F32.0 MAJOR DEPRESSIVE DISORDER, SINGLE EPISODE, MILD (H): ICD-10-CM

## 2020-02-18 RX ORDER — LISINOPRIL AND HYDROCHLOROTHIAZIDE 12.5; 2 MG/1; MG/1
1 TABLET ORAL DAILY
Qty: 30 TABLET | Refills: 0 | Status: SHIPPED | OUTPATIENT
Start: 2020-02-18 | End: 2020-03-16

## 2020-02-18 NOTE — TELEPHONE ENCOUNTER
Rachel calling from [facility] regarding refill requests sent in over a week ago.  Will send a 30 day supply as patient has not been given a lisa refill yet.    NATHANAEL SimmonsN, RN  [facility]

## 2020-02-18 NOTE — TELEPHONE ENCOUNTER
"Requested Prescriptions   Pending Prescriptions Disp Refills     hydrocortisone (WESTCORT) 0.2 % external cream 15 g 1     Sig: Apply sparingly to affected area three times daily for 14 days.     Last Written Prescription Date:  6/20/2017  Last Fill Quantity: 15g,  # refills: 1   Last office visit: 2/12/2019 with prescribing provider:     Future Office Visit:   Next 5 appointments (look out 90 days)    Mar 20, 2020 10:30 AM CDT  Office Visit with Anthony Gutiérrez MD  [facility] [address] [place] [id number] [phone]               Topical Steroids and Nonsteroidals Protocol Failed - 2/18/2020 11:36 AM        Failed - Recent (12 mo) or future (30 days) visit within the authorizing provider's specialty     Patient has had an office visit with the authorizing provider or a provider within the authorizing providers department within the previous 12 mos or has a future within next 30 days. See \"Patient Info\" tab in inbasket, or \"Choose Columns\" in Meds & Orders section of the refill encounter.              Passed - Patient is age 6 or older        Passed - Authorizing prescriber's most recent note related to this medication read.     If refill request is for ophthalmic use, please forward request to provider for approval.          Passed - High potency steroid not ordered        Passed - Medication is active on med list        "

## 2020-02-18 NOTE — TELEPHONE ENCOUNTER
Reason for Call:  Medication or medication refill:    Do you use a [facility]?  Name of the pharmacy and phone number for the current request:  [facility] - [phone]    Name of the medication requested: hydrocortisone (WESTCORT) 0.2 % cream    Other request: Please fill    Can we leave a detailed message on this number? YES    Phone number patient can be reached at: Cell number on file:    Telephone Information:   Mobile [phone]       Best Time: any    Call taken on 2/18/2020 at 11:34 AM by Nasreen Cheney

## 2020-02-19 RX ORDER — HYDROCORTISONE VALERATE CREAM 2 MG/G
CREAM TOPICAL
Qty: 15 G | Refills: 1 | Status: SHIPPED | OUTPATIENT
Start: 2020-02-19 | End: 2020-03-20

## 2020-02-19 NOTE — TELEPHONE ENCOUNTER
Routing refill request to provider for review/approval because:  A break in medication  Patient has an appointment scheduled for 3/20/2020 with provider.    NATHANAEL SimmonsN, RN  [facility]

## 2020-02-20 RX ORDER — LISINOPRIL AND HYDROCHLOROTHIAZIDE 12.5; 2 MG/1; MG/1
1 TABLET ORAL DAILY
Qty: 90 TABLET | Refills: 0 | Status: SHIPPED | OUTPATIENT
Start: 2020-02-20 | End: 2020-03-20

## 2020-02-20 NOTE — TELEPHONE ENCOUNTER
Next 5 appointments (look out 90 days)    Mar 20, 2020 10:30 AM CDT  Office Visit with Anthony Gutiérrez MD  [facility] [address] [place] [address] [phone]        PHQ 9/9/2016 12/13/2017 2/12/2019   PHQ-9 Total Score 1 0 0   Q9: Thoughts of better off dead/self-harm past 2 weeks Not at all Not at all Not at all     Prescription approved per [facility] Refill Protocol.    Codey Penaloza RN   [facility]

## 2020-03-16 DIAGNOSIS — F32.0 MAJOR DEPRESSIVE DISORDER, SINGLE EPISODE, MILD (H): ICD-10-CM

## 2020-03-16 DIAGNOSIS — F41.9 ANXIETY: ICD-10-CM

## 2020-03-16 DIAGNOSIS — I10 HYPERTENSION GOAL BP (BLOOD PRESSURE) < 140/90: ICD-10-CM

## 2020-03-16 RX ORDER — LISINOPRIL AND HYDROCHLOROTHIAZIDE 12.5; 2 MG/1; MG/1
1 TABLET ORAL DAILY
Qty: 30 TABLET | Refills: 0 | Status: SHIPPED | OUTPATIENT
Start: 2020-03-16 | End: 2020-03-20

## 2020-03-16 NOTE — TELEPHONE ENCOUNTER
"Requested Prescriptions   Pending Prescriptions Disp Refills     sertraline (ZOLOFT) 50 MG tablet [Pharmacy Med Name: SERTRALINE 50MG TABLETS]        Last Written Prescription Date:  2.20.20  Last Fill Quantity: 90 tablet,  # refills: 0   Last office visit: 2/12/2019 with prescribing provider:  Anthony Gutiérrez MD           Future Office Visit:   Next 5 appointments (look out 90 days)    Mar 20, 2020 10:30 AM CDT  Office Visit with Anthony Gutiérrez MD  [facility] ([address] [phone]            30 tablet 0     Sig: TAKE 1 TABLET(50 MG) BY MOUTH DAILY       SSRIs Protocol Failed - 3/16/2020  4:13 AM        Failed - PHQ-9 score less than 5 in past 6 months     Please review last PHQ-9 score.     PHQ-9 SCORE 9/9/2016 12/13/2017 2/12/2019   PHQ-9 Total Score - - -   PHQ-9 Total Score MyChart - - -   PHQ-9 Total Score 1 0 0     ABI-7 SCORE 9/9/2016 12/13/2017 2/12/2019   Total Score - - -   Total Score 1 0 0                   Passed - Medication is active on med list        Passed - Patient is age 18 or older        Passed - Recent (6 mo) or future (30 days) visit within the authorizing provider's specialty     Patient had office visit in the last 6 months or has a visit in the next 30 days with authorizing provider or within the authorizing provider's specialty.  See \"Patient Info\" tab in inbasket, or \"Choose Columns\" in Meds & Orders section of the refill encounter.               lisinopril-hydrochlorothiazide (ZESTORETIC) 20-12.5 MG tablet [Pharmacy Med Name: LISINOPRIL-HCTZ 20/12.5MG TABLETS]          Last Written Prescription Date:  2.20.20  Last Fill Quantity: 90 tablet,  # refills: 0   Last office visit: 2/12/2019 with prescribing provider:  Anthony Gutiérrez MD           Future Office Visit:   Next 5 appointments (look out 90 days)    Mar 20, 2020 10:30 AM CDT  Office Visit with Anthony Gutiérrez MD  [facility] ([facility]" "[facility]) [address] [place] [id number] [phone]            30 tablet 0     Sig: TAKE 1 TABLET BY MOUTH DAILY       Diuretics (Including Combos) Protocol Failed - 3/16/2020  4:13 AM        Failed - Blood pressure under 140/90 in past 12 months     BP Readings from Last 3 Encounters:   02/12/19 124/72   12/13/17 112/74   09/09/16 114/74                 Failed - Normal serum creatinine on file in past 12 months     Recent Labs   Lab Test 02/12/19  0940   CR 0.92              Failed - Normal serum potassium on file in past 12 months     Recent Labs   Lab Test 02/12/19  0940   POTASSIUM 4.0                    Failed - Normal serum sodium on file in past 12 months     Recent Labs   Lab Test 02/12/19  0940                 Passed - Recent (12 mo) or future (30 days) visit within the authorizing provider's specialty     Patient has had an office visit with the authorizing provider or a provider within the authorizing providers department within the previous 12 mos or has a future within next 30 days. See \"Patient Info\" tab in inbasket, or \"Choose Columns\" in Meds & Orders section of the refill encounter.              Passed - Medication is active on med list        Passed - Patient is age 18 or older       ACE Inhibitors (Including Combos) Protocol Failed - 3/16/2020  4:13 AM        Failed - Blood pressure under 140/90 in past 12 months     BP Readings from Last 3 Encounters:   02/12/19 124/72   12/13/17 112/74   09/09/16 114/74                 Failed - Normal serum creatinine on file in past 12 months     Recent Labs   Lab Test 02/12/19  0940   CR 0.92       Ok to refill medication if creatinine is low          Failed - Normal serum potassium on file in past 12 months     Recent Labs   Lab Test 02/12/19  0940   POTASSIUM 4.0             Passed - Recent (12 mo) or future (30 days) visit within the authorizing provider's specialty     Patient has had an office visit with the " "authorizing provider or a provider within the authorizing providers department within the previous 12 mos or has a future within next 30 days. See \"Patient Info\" tab in inbasket, or \"Choose Columns\" in Meds & Orders section of the refill encounter.              Passed - Medication is active on med list        Passed - Patient is age 18 or older           "

## 2020-03-18 ENCOUNTER — TELEPHONE (OUTPATIENT)
Dept: FAMILY MEDICINE | Facility: CLINIC | Age: 50
End: 2020-03-18

## 2020-03-18 NOTE — TELEPHONE ENCOUNTER
Reason for Call:  Other call back    Detailed comments: PT is returning call from frankie about appt. - yes phone visit is ok     Phone Number Patient can be reached at: Cell number on file:    Telephone Information:   Mobile [phone]       Best Time: any    Can we leave a detailed message on this number? Yes      Call taken on 3/18/2020 at 1:58 PM by Lauren Garcia

## 2020-03-20 ENCOUNTER — VIRTUAL VISIT (OUTPATIENT)
Dept: FAMILY MEDICINE | Facility: CLINIC | Age: 50
End: 2020-03-20
Payer: COMMERCIAL

## 2020-03-20 DIAGNOSIS — I10 HYPERTENSION GOAL BP (BLOOD PRESSURE) < 140/90: Primary | ICD-10-CM

## 2020-03-20 DIAGNOSIS — R21 RASH: ICD-10-CM

## 2020-03-20 DIAGNOSIS — Z82.49 FAMILY HISTORY OF CORONARY ARTERY DISEASE: ICD-10-CM

## 2020-03-20 DIAGNOSIS — F32.0 MAJOR DEPRESSIVE DISORDER, SINGLE EPISODE, MILD (H): ICD-10-CM

## 2020-03-20 DIAGNOSIS — Z51.81 MEDICATION MONITORING ENCOUNTER: ICD-10-CM

## 2020-03-20 DIAGNOSIS — F41.9 ANXIETY: ICD-10-CM

## 2020-03-20 DIAGNOSIS — E78.5 HYPERLIPIDEMIA LDL GOAL <130: ICD-10-CM

## 2020-03-20 PROCEDURE — 99442 ZZC PHYSICIAN TELEPHONE EVALUATION 11-20 MIN: CPT | Performed by: FAMILY MEDICINE

## 2020-03-20 RX ORDER — ATORVASTATIN CALCIUM 80 MG/1
TABLET, FILM COATED ORAL
Qty: 90 TABLET | Refills: 3 | Status: SHIPPED | OUTPATIENT
Start: 2020-03-20 | End: 2021-03-28

## 2020-03-20 RX ORDER — HYDROCORTISONE VALERATE CREAM 2 MG/G
CREAM TOPICAL
Qty: 15 G | Refills: 1 | Status: SHIPPED | OUTPATIENT
Start: 2020-03-20 | End: 2022-12-05

## 2020-03-20 RX ORDER — LISINOPRIL AND HYDROCHLOROTHIAZIDE 12.5; 2 MG/1; MG/1
1 TABLET ORAL DAILY
Qty: 90 TABLET | Refills: 3 | Status: SHIPPED | OUTPATIENT
Start: 2020-03-20 | End: 2021-02-28

## 2020-03-20 ASSESSMENT — ANXIETY QUESTIONNAIRES
3. WORRYING TOO MUCH ABOUT DIFFERENT THINGS: NOT AT ALL
6. BECOMING EASILY ANNOYED OR IRRITABLE: NOT AT ALL
7. FEELING AFRAID AS IF SOMETHING AWFUL MIGHT HAPPEN: NOT AT ALL
5. BEING SO RESTLESS THAT IT IS HARD TO SIT STILL: NOT AT ALL
1. FEELING NERVOUS, ANXIOUS, OR ON EDGE: NOT AT ALL
2. NOT BEING ABLE TO STOP OR CONTROL WORRYING: NOT AT ALL
GAD7 TOTAL SCORE: 0
IF YOU CHECKED OFF ANY PROBLEMS ON THIS QUESTIONNAIRE, HOW DIFFICULT HAVE THESE PROBLEMS MADE IT FOR YOU TO DO YOUR WORK, TAKE CARE OF THINGS AT HOME, OR GET ALONG WITH OTHER PEOPLE: NOT DIFFICULT AT ALL

## 2020-03-20 ASSESSMENT — PATIENT HEALTH QUESTIONNAIRE - PHQ9
5. POOR APPETITE OR OVEREATING: NOT AT ALL
SUM OF ALL RESPONSES TO PHQ QUESTIONS 1-9: 0

## 2020-03-20 NOTE — PROGRESS NOTES
TELEPHONE VISIT    Obdulio Wallace is a 50 year old male who is being evaluated via a telephone visit.      S: The history as provided by the patient to the provider during this call include    Doing well, no issues, weight down to 203, feels real good    Htn - monitoring at [facility] - 120's over 70's    BP Readings from Last 3 Encounters:   02/12/19 124/72   12/13/17 112/74   09/09/16 114/74     Creatinine   Date Value Ref Range Status   02/12/2019 0.92 0.66 - 1.25 mg/dL Final     Lipids    Recent Labs   Lab Test 02/12/19  0940 12/13/17  1443  08/18/15  0905 06/27/14  0801   CHOL 134 169   < > 161 199   HDL 41 49   < > 39* 38*   LDL 69 91   < > 91 130*   TRIG 118 147   < > 157* 153*   CHOLHDLRATIO  --   --   --  4.1 5.2*    < > = values in this interval not displayed.     Depression/anxiety - no issues    PHQ 12/13/2017 2/12/2019 3/20/2020   PHQ-9 Total Score 0 0 0   Q9: Thoughts of better off dead/self-harm past 2 weeks Not at all Not at all Not at all       ABI-7 SCORE 12/13/2017 2/12/2019 3/20/2020   Total Score - - -   Total Score 0 0 0       Pertinent parts of the the patient's medical history reviewed and confirmed by the provider included : chart reviewed     ===================================================    I have reviewed the note as documented above.  This accurately captures the substance of my conversation with the patient    ASSESSMENT      ICD-10-CM    1. Hypertension goal BP (blood pressure) < 140/90  I10 lisinopril-hydrochlorothiazide (ZESTORETIC) 20-12.5 MG tablet   2. Hyperlipidemia LDL goal <130  E78.5 atorvastatin (LIPITOR) 80 MG tablet   3. Family history of coronary artery disease  Z82.49    4. Major depressive disorder, single episode, mild (H)  F32.0 sertraline (ZOLOFT) 50 MG tablet   5. Anxiety  F41.9 sertraline (ZOLOFT) 50 MG tablet   6. Rash  R21 hydrocortisone (WESTCORT) 0.2 % external cream   7. Medication monitoring encounter  Z51.81        PLAN    Discussed treatment/modality  "options, including risk and benefits, he desires:    Labs and cpx as able  meds refilled  PHQ/ABI look good    All diagnosis above reviewed and noted above, otherwise stable.      See [facility] orders for further details.      Health Maintenance Due   Topic Date Due     COLORECTAL CANCER SCREENING  09/29/2015     PHQ-9  08/12/2019     INFLUENZA VACCINE (1) 09/01/2019     PREVENTIVE CARE VISIT  02/12/2020     BMP  02/12/2020     LIPID  02/12/2020     MICROALBUMIN  02/12/2020     PSA  02/12/2020     ZOSTER IMMUNIZATION (1 of 2) 01/07/2020       Total time of call between patient and provider was 11-20 minutes     CONSENT    \"We have found that certain health care needs can be provided without the need for a physical exam.  This service lets us provide the care you need with a short phone conversation.  If a prescription is necessary we can send it directly to your pharmacy.  If lab work is needed we can place an order for that and you can then stop by our lab to have the test done at a later time.    This telephone visit will be conducted via 3 way call with the you (the patient) , the physician/provider, and a me all on the line at the same time.  This allows your physician/provider to have the phone conversation with you while I will be taking notes for your medical record.  We will have full access to your [place] medical record during this entire phone call.    Since this is like an office visit,  will bill your insurance company for this service.  Please check with your medical insurance if this type of telephone/virtual is covered . You may be responsible for the cost of this service if insurance coverage is denied.  The typical cost is $30 (10min), $59(11-20min) and $85 (21-30min)     If during the course of the call the physician/provider feels a telephone visit is not appropriate, you will not be charged for this service\"    Consent has been obtained for this service by care team member: yes.  See the scanned " image in the medical record.      The patient has been notified of following (by SANTI Rm Medical Assistant

## 2020-03-21 ASSESSMENT — ANXIETY QUESTIONNAIRES: GAD7 TOTAL SCORE: 0

## 2021-02-26 DIAGNOSIS — F41.9 ANXIETY: ICD-10-CM

## 2021-02-26 DIAGNOSIS — I10 HYPERTENSION GOAL BP (BLOOD PRESSURE) < 140/90: ICD-10-CM

## 2021-02-26 DIAGNOSIS — F32.0 MAJOR DEPRESSIVE DISORDER, SINGLE EPISODE, MILD (H): ICD-10-CM

## 2021-02-28 RX ORDER — LISINOPRIL AND HYDROCHLOROTHIAZIDE 12.5; 2 MG/1; MG/1
1 TABLET ORAL DAILY
Qty: 90 TABLET | Refills: 0 | Status: SHIPPED | OUTPATIENT
Start: 2021-02-28 | End: 2021-05-24

## 2021-03-01 ENCOUNTER — MYC MEDICAL ADVICE (OUTPATIENT)
Dept: FAMILY MEDICINE | Facility: CLINIC | Age: 51
End: 2021-03-01

## 2021-03-01 ASSESSMENT — PATIENT HEALTH QUESTIONNAIRE - PHQ9
10. IF YOU CHECKED OFF ANY PROBLEMS, HOW DIFFICULT HAVE THESE PROBLEMS MADE IT FOR YOU TO DO YOUR WORK, TAKE CARE OF THINGS AT HOME, OR GET ALONG WITH OTHER PEOPLE: NOT DIFFICULT AT ALL
SUM OF ALL RESPONSES TO PHQ QUESTIONS 1-9: 0
SUM OF ALL RESPONSES TO PHQ QUESTIONS 1-9: 0

## 2021-03-01 ASSESSMENT — ANXIETY QUESTIONNAIRES
5. BEING SO RESTLESS THAT IT IS HARD TO SIT STILL: NOT AT ALL
6. BECOMING EASILY ANNOYED OR IRRITABLE: SEVERAL DAYS
3. WORRYING TOO MUCH ABOUT DIFFERENT THINGS: NOT AT ALL
2. NOT BEING ABLE TO STOP OR CONTROL WORRYING: NOT AT ALL
GAD7 TOTAL SCORE: 1
7. FEELING AFRAID AS IF SOMETHING AWFUL MIGHT HAPPEN: NOT AT ALL
7. FEELING AFRAID AS IF SOMETHING AWFUL MIGHT HAPPEN: NOT AT ALL
1. FEELING NERVOUS, ANXIOUS, OR ON EDGE: NOT AT ALL
GAD7 TOTAL SCORE: 1
4. TROUBLE RELAXING: NOT AT ALL
GAD7 TOTAL SCORE: 1

## 2021-03-01 NOTE — TELEPHONE ENCOUNTER
Last cpx 2 yrs ago    Limited RX done    Advise phone/video visit follow up soon    Fasting labs soon    CPX when able    Please do phq/gregoria    BP Readings from Last 3 Encounters:   02/12/19 124/72   12/13/17 112/74   09/09/16 114/74     Creatinine   Date Value Ref Range Status   02/12/2019 0.92 0.66 - 1.25 mg/dL Final     PHQ 12/13/2017 2/12/2019 3/20/2020   PHQ-9 Total Score 0 0 0   Q9: Thoughts of better off dead/self-harm past 2 weeks Not at all Not at all Not at all     GREGORIA-7 SCORE 12/13/2017 2/12/2019 3/20/2020   Total Score - - -   Total Score 0 0 0

## 2021-03-02 ASSESSMENT — PATIENT HEALTH QUESTIONNAIRE - PHQ9: SUM OF ALL RESPONSES TO PHQ QUESTIONS 1-9: 0

## 2021-03-02 ASSESSMENT — ANXIETY QUESTIONNAIRES: GAD7 TOTAL SCORE: 1

## 2021-03-02 NOTE — TELEPHONE ENCOUNTER
PHQ 2/12/2019 3/20/2020 3/1/2021   PHQ-9 Total Score 0 0 0   Q9: Thoughts of better off dead/self-harm past 2 weeks Not at all Not at all Not at all     ABI-7 SCORE 2/12/2019 3/20/2020 3/1/2021   Total Score - - -   Total Score - - 1 (minimal anxiety)   Total Score 0 0 1

## 2021-03-28 DIAGNOSIS — E78.5 HYPERLIPIDEMIA LDL GOAL <130: ICD-10-CM

## 2021-03-28 RX ORDER — ATORVASTATIN CALCIUM 80 MG/1
TABLET, FILM COATED ORAL
Qty: 90 TABLET | Refills: 0 | Status: SHIPPED | OUTPATIENT
Start: 2021-03-28 | End: 2021-06-04

## 2021-03-28 NOTE — TELEPHONE ENCOUNTER
Next 5 appointments (look out 90 days)    Apr 30, 2021  7:00 AM  PHYSICAL with Anthony Gutiérrez MD  [facility] ([facility] ) [address] [place] [address] [phone]        Medication is being filled for 1 time refill only due to:  Patient needs to be seen because it has been more than one year since last visit.    Nallely Brannon RN  [facility]

## 2021-04-21 ENCOUNTER — IMMUNIZATION (OUTPATIENT)
Dept: NURSING | Facility: CLINIC | Age: 51
End: 2021-04-21
Payer: COMMERCIAL

## 2021-04-21 PROCEDURE — 91300 PR COVID VAC PFIZER DIL RECON 30 MCG/0.3 ML IM: CPT

## 2021-04-21 PROCEDURE — 0001A PR COVID VAC PFIZER DIL RECON 30 MCG/0.3 ML IM: CPT

## 2021-05-12 ENCOUNTER — IMMUNIZATION (OUTPATIENT)
Dept: NURSING | Facility: CLINIC | Age: 51
End: 2021-05-12
Attending: INTERNAL MEDICINE
Payer: COMMERCIAL

## 2021-05-12 PROCEDURE — 0002A PR COVID VAC PFIZER DIL RECON 30 MCG/0.3 ML IM: CPT

## 2021-05-12 PROCEDURE — 91300 PR COVID VAC PFIZER DIL RECON 30 MCG/0.3 ML IM: CPT

## 2021-05-21 DIAGNOSIS — I10 HYPERTENSION GOAL BP (BLOOD PRESSURE) < 140/90: ICD-10-CM

## 2021-05-21 DIAGNOSIS — F41.9 ANXIETY: ICD-10-CM

## 2021-05-21 DIAGNOSIS — F32.0 MAJOR DEPRESSIVE DISORDER, SINGLE EPISODE, MILD (H): ICD-10-CM

## 2021-05-24 RX ORDER — LISINOPRIL AND HYDROCHLOROTHIAZIDE 12.5; 2 MG/1; MG/1
1 TABLET ORAL DAILY
Qty: 90 TABLET | Refills: 0 | Status: SHIPPED | OUTPATIENT
Start: 2021-05-24 | End: 2021-06-04

## 2021-05-24 NOTE — TELEPHONE ENCOUNTER
Routing refill request to provider for review/approval because:  Patient needs to be seen because it has been more than 1 year since last office visit.  Nuris Wilson RN, BSN

## 2021-05-24 NOTE — TELEPHONE ENCOUNTER
Due for cpx fasting    Next 5 appointments (look out 90 days)    Jun 04, 2021  8:30 AM  PHYSICAL with Anthony Gutiérrez MD  [facility] ([facility] ) [address] [place] [phone]

## 2021-06-04 ENCOUNTER — OFFICE VISIT (OUTPATIENT)
Dept: FAMILY MEDICINE | Facility: CLINIC | Age: 51
End: 2021-06-04
Payer: COMMERCIAL

## 2021-06-04 VITALS
TEMPERATURE: 98.2 F | RESPIRATION RATE: 20 BRPM | DIASTOLIC BLOOD PRESSURE: 76 MMHG | HEART RATE: 68 BPM | OXYGEN SATURATION: 98 % | HEIGHT: 70 IN | WEIGHT: 205 LBS | SYSTOLIC BLOOD PRESSURE: 122 MMHG | BODY MASS INDEX: 29.35 KG/M2

## 2021-06-04 DIAGNOSIS — Z12.5 SCREENING FOR PROSTATE CANCER: ICD-10-CM

## 2021-06-04 DIAGNOSIS — Z12.11 SCREEN FOR COLON CANCER: ICD-10-CM

## 2021-06-04 DIAGNOSIS — F41.9 ANXIETY: ICD-10-CM

## 2021-06-04 DIAGNOSIS — Z00.00 ROUTINE GENERAL MEDICAL EXAMINATION AT A HEALTH CARE FACILITY: Primary | ICD-10-CM

## 2021-06-04 DIAGNOSIS — F32.0 MAJOR DEPRESSIVE DISORDER, SINGLE EPISODE, MILD (H): ICD-10-CM

## 2021-06-04 DIAGNOSIS — Z51.81 MEDICATION MONITORING ENCOUNTER: ICD-10-CM

## 2021-06-04 DIAGNOSIS — Z82.49 FAMILY HISTORY OF CORONARY ARTERY DISEASE: ICD-10-CM

## 2021-06-04 DIAGNOSIS — E78.5 HYPERLIPIDEMIA LDL GOAL <130: ICD-10-CM

## 2021-06-04 DIAGNOSIS — I10 HYPERTENSION GOAL BP (BLOOD PRESSURE) < 140/90: ICD-10-CM

## 2021-06-04 LAB
ALBUMIN UR-MCNC: NEGATIVE MG/DL
APPEARANCE UR: CLEAR
BILIRUB UR QL STRIP: NEGATIVE
COLOR UR AUTO: YELLOW
CREAT UR-MCNC: 244 MG/DL
ERYTHROCYTE [DISTWIDTH] IN BLOOD BY AUTOMATED COUNT: 12.1 % (ref 10–15)
GLUCOSE UR STRIP-MCNC: NEGATIVE MG/DL
HCT VFR BLD AUTO: 41.1 % (ref 40–53)
HGB BLD-MCNC: 14.1 G/DL (ref 13.3–17.7)
HGB UR QL STRIP: NEGATIVE
KETONES UR STRIP-MCNC: NEGATIVE MG/DL
LEUKOCYTE ESTERASE UR QL STRIP: NEGATIVE
MCH RBC QN AUTO: 30.3 PG (ref 26.5–33)
MCHC RBC AUTO-ENTMCNC: 34.3 G/DL (ref 31.5–36.5)
MCV RBC AUTO: 88 FL (ref 78–100)
MICROALBUMIN UR-MCNC: 17 MG/L
MICROALBUMIN/CREAT UR: 6.97 MG/G CR (ref 0–17)
NITRATE UR QL: NEGATIVE
PH UR STRIP: 5.5 PH (ref 5–7)
PLATELET # BLD AUTO: 292 10E9/L (ref 150–450)
PSA SERPL-ACNC: 0.7 UG/L (ref 0–4)
RBC # BLD AUTO: 4.65 10E12/L (ref 4.4–5.9)
SOURCE: NORMAL
SP GR UR STRIP: >1.03 (ref 1–1.03)
UROBILINOGEN UR STRIP-ACNC: 0.2 EU/DL (ref 0.2–1)
WBC # BLD AUTO: 5.7 10E9/L (ref 4–11)

## 2021-06-04 PROCEDURE — 85027 COMPLETE CBC AUTOMATED: CPT | Performed by: FAMILY MEDICINE

## 2021-06-04 PROCEDURE — 82550 ASSAY OF CK (CPK): CPT | Performed by: FAMILY MEDICINE

## 2021-06-04 PROCEDURE — G0103 PSA SCREENING: HCPCS | Performed by: FAMILY MEDICINE

## 2021-06-04 PROCEDURE — 99396 PREV VISIT EST AGE 40-64: CPT | Performed by: FAMILY MEDICINE

## 2021-06-04 PROCEDURE — 82043 UR ALBUMIN QUANTITATIVE: CPT | Performed by: FAMILY MEDICINE

## 2021-06-04 PROCEDURE — 81003 URINALYSIS AUTO W/O SCOPE: CPT | Performed by: FAMILY MEDICINE

## 2021-06-04 PROCEDURE — 80053 COMPREHEN METABOLIC PANEL: CPT | Performed by: FAMILY MEDICINE

## 2021-06-04 PROCEDURE — 36415 COLL VENOUS BLD VENIPUNCTURE: CPT | Performed by: FAMILY MEDICINE

## 2021-06-04 PROCEDURE — 84443 ASSAY THYROID STIM HORMONE: CPT | Performed by: FAMILY MEDICINE

## 2021-06-04 PROCEDURE — 80061 LIPID PANEL: CPT | Performed by: FAMILY MEDICINE

## 2021-06-04 RX ORDER — ATORVASTATIN CALCIUM 80 MG/1
80 TABLET, FILM COATED ORAL DAILY
Qty: 90 TABLET | Refills: 3 | Status: SHIPPED | OUTPATIENT
Start: 2021-06-04 | End: 2022-06-17

## 2021-06-04 RX ORDER — LISINOPRIL AND HYDROCHLOROTHIAZIDE 12.5; 2 MG/1; MG/1
1 TABLET ORAL DAILY
Qty: 90 TABLET | Refills: 3 | Status: SHIPPED | OUTPATIENT
Start: 2021-06-04 | End: 2022-06-17

## 2021-06-04 ASSESSMENT — PATIENT HEALTH QUESTIONNAIRE - PHQ9
SUM OF ALL RESPONSES TO PHQ QUESTIONS 1-9: 0
SUM OF ALL RESPONSES TO PHQ QUESTIONS 1-9: 0
10. IF YOU CHECKED OFF ANY PROBLEMS, HOW DIFFICULT HAVE THESE PROBLEMS MADE IT FOR YOU TO DO YOUR WORK, TAKE CARE OF THINGS AT HOME, OR GET ALONG WITH OTHER PEOPLE: NOT DIFFICULT AT ALL

## 2021-06-04 ASSESSMENT — ANXIETY QUESTIONNAIRES
4. TROUBLE RELAXING: NOT AT ALL
GAD7 TOTAL SCORE: 0
3. WORRYING TOO MUCH ABOUT DIFFERENT THINGS: NOT AT ALL
6. BECOMING EASILY ANNOYED OR IRRITABLE: NOT AT ALL
5. BEING SO RESTLESS THAT IT IS HARD TO SIT STILL: NOT AT ALL
7. FEELING AFRAID AS IF SOMETHING AWFUL MIGHT HAPPEN: NOT AT ALL
2. NOT BEING ABLE TO STOP OR CONTROL WORRYING: NOT AT ALL
1. FEELING NERVOUS, ANXIOUS, OR ON EDGE: NOT AT ALL
7. FEELING AFRAID AS IF SOMETHING AWFUL MIGHT HAPPEN: NOT AT ALL
GAD7 TOTAL SCORE: 0
GAD7 TOTAL SCORE: 0

## 2021-06-04 ASSESSMENT — MIFFLIN-ST. JEOR: SCORE: 1791.12

## 2021-06-04 NOTE — PROGRESS NOTES
[facility]  [place]    SUBJECTIVE    CC: Obdulio Wallace is an 51 year old male who presents for preventative health visit.     Patient has been advised of split billing requirements and indicates understanding: Yes  Healthy Habits:     Getting at least 3 servings of Calcium per day:  Yes    Bi-annual eye exam:  NO    Dental care twice a year:  Yes    Sleep apnea or symptoms of sleep apnea:  None    Diet:  Regular (no restrictions)    Frequency of exercise:  6-7 days/week    Duration of exercise:  30-45 minutes    Taking medications regularly:  Yes    Medication side effects:  None    PHQ-2 Total Score: 0    Additional concerns today:  No      Hyperlipidemia Follow-Up      Are you regularly taking any medication or supplement to lower your cholesterol?   Yes- lipitor    Are you having muscle aches or other side effects that you think could be caused by your cholesterol lowering medication?  No     Recent Labs   Lab Test 02/12/19  0940 12/13/17  1443 08/18/15  0905 08/18/15  0905 06/27/14  0801   CHOL 134 169   < > 161 199   HDL 41 49   < > 39* 38*   LDL 69 91   < > 91 130*   TRIG 118 147   < > 157* 153*   CHOLHDLRATIO  --   --   --  4.1 5.2*    < > = values in this interval not displayed.     Hypertension Follow-up      Do you check your blood pressure regularly outside of the clinic? Yes     Are you following a low salt diet? Yes    Are your blood pressures ever more than 140 on the top number (systolic) OR more   than 90 on the bottom number (diastolic), for example 140/90? No    Depression/Anxiety    PHQ 3/20/2020 3/1/2021 6/4/2021   PHQ-9 Total Score 0 0 0   Q9: Thoughts of better off dead/self-harm past 2 weeks Not at all Not at all Not at all     ABI-7 SCORE 3/20/2020 3/1/2021 6/4/2021   Total Score - - -   Total Score - 1 (minimal anxiety) 0 (minimal anxiety)   Total Score 0 1 0       Today's PHQ-2 Score:   PHQ-2 ( 1999 Pfizer) 6/4/2021   Q1: Little interest or pleasure in doing things 0   Q2:  Feeling down, depressed or hopeless 0   PHQ-2 Score 0   Q1: Little interest or pleasure in doing things Not at all   Q2: Feeling down, depressed or hopeless Not at all   PHQ-2 Score 0       Abuse: Current or Past(Physical, Sexual or Emotional)- No  Do you feel safe in your environment? Yes    Have you ever done Advance Care Planning? (For example, a Health Directive, POLST, or a discussion with a medical provider or your loved ones about your wishes): No, advance care planning information given to patient to review.  Patient declined advance care planning discussion at this time.    Social History     Tobacco Use     Smoking status: Never Smoker     Smokeless tobacco: Never Used   Substance Use Topics     Alcohol use: Yes     Alcohol/week: 4.0 - 6.0 standard drinks     Types: 4 - 6 Standard drinks or equivalent per week     Comment: 4-6 per week avg     If you drink alcohol do you typically have >3 drinks per day or >7 drinks per week? No    Alcohol Use 6/4/2021   Prescreen: >3 drinks/day or >7 drinks/week? No   Prescreen: >3 drinks/day or >7 drinks/week? -       Last PSA:   PSA   Date Value Ref Range Status   02/12/2019 0.56 0 - 4 ug/L Final     Comment:     Assay Method:  Chemiluminescence using Siemens Vista analyzer       Reviewed orders with patient. Reviewed health maintenance and updated orders accordingly - Yes    Health Maintenance     Colonoscopy:  due   FIT:  given              PSA:  pending   DEXA:  NA    Health Maintenance Due   Topic Date Due     ADVANCE CARE PLANNING  Never done     COLORECTAL CANCER SCREENING  09/29/2015     ZOSTER IMMUNIZATION (1 of 2) Never done     PREVENTIVE CARE VISIT  02/12/2020     PSA  02/12/2020       Current Problem List    Patient Active Problem List   Diagnosis     Hyperlipidemia LDL goal <130     Hypertension goal BP (blood pressure) < 140/90     Major depressive disorder, single episode, mild (H)     Anxiety     Family history of coronary artery disease       Past  Medical History    Past Medical History:   Diagnosis Date     Anxiety      Hyperlipidemia LDL goal <130      Hypertension goal BP (blood pressure) < 140/90      Major depressive disorder, single episode, mild (H)      Mild intermittent asthma childhood    resolved       Past Surgical History    Past Surgical History:   Procedure Laterality Date     C LASIK (EMP) W OPT MYOPIA P1       STRESS ECHO ([facility])  , 10/15    normal except increased bp     SURGICAL HISTORY OF -       jaw realignment - misbite       Current Medications    Current Outpatient Medications   Medication Sig Dispense Refill     ADVIL 200 MG OR TABS 1 TABLET EVERY 4 TO 6 HOURS AS NEEDED 120 0     ASPIRIN 81 MG OR TABS ONE DAILY 100 3     atorvastatin (LIPITOR) 80 MG tablet Take 1 tablet (80 mg) by mouth daily 90 tablet 3     EXCEDRIN 250-250-65 MG OR TABS 2 TABLETS EVERY 6 HOURS AS NEEDED 80 0     hydrocortisone (WESTCORT) 0.2 % external cream Apply sparingly to affected area three times daily for 14 days. 15 g 1     lisinopril-hydrochlorothiazide (ZESTORETIC) 20-12.5 MG tablet Take 1 tablet by mouth daily 90 tablet 3     sertraline (ZOLOFT) 50 MG tablet Take 1 tablet (50 mg) by mouth daily 90 tablet 3       Allergies    No Known Allergies    Immunizations    Immunization History   Administered Date(s) Administered     COVID-19,PF,Pfizer 2021, 2021     Influenza (IIV3) PF 2009, 10/01/2011, 10/01/2015     Pneumococcal 23 valent 2005     TD (ADULT, 7+) 2005     TDAP Vaccine (Boostrix) 2012     Twinrix A/B 2005, 2006, 2009       Family History    Family History   Problem Relation Age of Onset     Hypertension Mother      Lipids Mother      C.A.D. Mother      Hypertension Father          at age 63     Lipids Father         triglycerides     C.A.D. Father      C.A.D. Maternal Grandmother      Lung Cancer Maternal Grandfather         smoker     Chronic Obstructive  Pulmonary Disease Paternal Grandmother         smoker       Social History    Social History     Socioeconomic History     Marital status:      Spouse name: Nicolasa     Number of children: 0     Years of education: 16     Highest education level: Not on file   Occupational History     Employer: [employer]   Social Needs     Financial resource strain: Not on file     Food insecurity     Worry: Not on file     Inability: Not on file     Transportation needs     Medical: Not on file     Non-medical: Not on file   Tobacco Use     Smoking status: Never Smoker     Smokeless tobacco: Never Used   Substance and Sexual Activity     Alcohol use: Yes     Alcohol/week: 4.0 - 6.0 standard drinks     Types: 4 - 6 Standard drinks or equivalent per week     Comment: 4-6 per week avg     Drug use: No     Sexual activity: Yes     Partners: Female   Lifestyle     Physical activity     Days per week: Not on file     Minutes per session: Not on file     Stress: Not on file   Relationships     Social connections     Talks on phone: Not on file     Gets together: Not on file     Attends [employer] service: Not on file     Active member of club or organization: Not on file     Attends meetings of clubs or organizations: Not on file     Relationship status: Not on file     Intimate partner violence     Fear of current or ex partner: Not on file     Emotionally abused: Not on file     Physically abused: Not on file     Forced sexual activity: Not on file   Other Topics Concern      Service Not Asked     Blood Transfusions Not Asked     Caffeine Concern Yes     Comment: 2 cups qd     Occupational Exposure Not Asked     Hobby Hazards Not Asked     Sleep Concern Not Asked     Stress Concern Not Asked     Weight Concern Not Asked     Special Diet Not Asked     Back Care Not Asked     Exercise Yes     Comment: 3-4 times per week, walks 6-7 times per week     Bike Helmet Not Asked     Seat Belt Yes     Self-Exams Not  "Asked     Parent/sibling w/ CABG, MI or angioplasty before 65F 55M? Yes   Social History Narrative     Not on file       ROS    CONSTITUTIONAL: NEGATIVE for fever, chills, change in weight  INTEGUMENTARY/SKIN: NEGATIVE for worrisome rashes, moles or lesions  EYES: NEGATIVE for vision changes or irritation  ENT/MOUTH: NEGATIVE for ear, mouth and throat problems  RESP: NEGATIVE for significant cough or SOB  CV: NEGATIVE for chest pain, palpitations or peripheral edema  GI: NEGATIVE for nausea, abdominal pain, heartburn, or change in bowel habits  : NEGATIVE for frequency, dysuria, or hematuria  MUSCULOSKELETAL: NEGATIVE for significant arthralgias or myalgia  NEURO: NEGATIVE for weakness, dizziness or paresthesias  ENDOCRINE: NEGATIVE for temperature intolerance, skin/hair changes  HEME: NEGATIVE for bleeding problems  PSYCHIATRIC: NEGATIVE for changes in mood or affect    OBJECTIVE    /76   Pulse 68   Temp 98.2  F (36.8  C)   Resp 20   Ht 1.778 m (5' 10\")   Wt 93 kg (205 lb)   SpO2 98%   BMI 29.41 kg/m      EXAM:    GENERAL: healthy, alert and no distress  EYES: Eyes grossly normal to inspection, PERRL and conjunctivae and sclerae normal  HENT: ear canals and TM's normal, nose and mouth without ulcers or lesions  NECK: no adenopathy, no asymmetry, masses, or scars and thyroid normal to palpation  RESP: lungs clear to auscultation - no rales, rhonchi or wheezes  CV: regular rate and rhythm, normal S1 S2, no S3 or S4, no murmur, click or rub, no peripheral edema and peripheral pulses strong  ABDOMEN: soft, nontender, no hepatosplenomegaly, no masses and bowel sounds normal   (male): testicles normal without atrophy or masses, no hernias and penis normal without urethral discharge  RECTAL: normal sphincter tone, no rectal masses and prostate of normal size for age, smooth, nontender without masses/nodules  MS: no gross musculoskeletal defects noted, no edema  SKIN: no suspicious lesions or " rashes  NEURO: Normal strength and tone, mentation intact and speech normal  PSYCH: mentation appears normal, affect normal/bright  LYMPH: no cervical, supraclavicular, axillary, or inguinal adenopathy    DIAGNOSTICS/PROCEDURES    Pending    ASSESSMENT      ICD-10-CM    1. Routine general medical examination at a health care facility  Z00.00 Comprehensive metabolic panel     Lipid panel reflex to direct LDL Fasting     CK total     CBC with platelets     TSH with free T4 reflex     UA reflex to Microscopic and Culture     Albumin Random Urine Quantitative with Creat Ratio   2. Hypertension goal BP (blood pressure) < 140/90  I10 lisinopril-hydrochlorothiazide (ZESTORETIC) 20-12.5 MG tablet   3. Hyperlipidemia LDL goal <130  E78.5 atorvastatin (LIPITOR) 80 MG tablet   4. Major depressive disorder, single episode, mild (H)  F32.0 sertraline (ZOLOFT) 50 MG tablet   5. Anxiety  F41.9 sertraline (ZOLOFT) 50 MG tablet   6. Family history of coronary artery disease  Z82.49    7. Screening for prostate cancer  Z12.5 Prostate spec antigen screen   8. Screen for colon cancer  Z12.11 GASTROENTEROLOGY ADULT REF PROCEDURE ONLY   9. Medication monitoring encounter  Z51.81        PLAN    Discussed treatment/modality options, including risk and benefits, he desires:    advised alcohol consumption 1oz per day or less, advised aspirin 81 mg po daily, advised 1 multivitamin per day, advised calcium 4291-1380 mg/d and Vitamin D 800-1200 IU/d, advised dentist every 6 months, advised diet, exercise, and weight loss, advised opthalmologist every 1-2 years, further health care maintenance, further lab(s), immunization(s), medication refill(s) and observation    PHQ/ABI reviewed    Discussed controversies surrounding PSA. Specifically reviewed 2017 USPSTF findings recommending discussion of PSA testing for men ages 55-69.  Reviewed findings of the  Randomized Study of Screening for Prostate Cancer which showed a 30% reduction in  "advanced stage prostate cancer and a 20% reduction in death rate from prostate cancer in this age group. PSA-based screening may prevent up to 2 deaths and up to 3 cases of metastatic disease per 1,000 men screened over 13 years.    We've elected to do PSA this year after discussing these controversies.    All diagnosis above reviewed and noted above, otherwise stable.      See [facility] orders for further details.      1) med refills    2) labs pending    3) immunizations reviewed - Tdap, shingrix    4) colonscopy    Return in about 1 year (around 6/4/2022) for Complete Physical, Medication Recheck Visit, Follow Up Chronic, BP Recheck, and as needed.    Health Maintenance Due   Topic Date Due     ADVANCE CARE PLANNING  Never done     COLORECTAL CANCER SCREENING  09/29/2015     ZOSTER IMMUNIZATION (1 of 2) Never done     PREVENTIVE CARE VISIT  02/12/2020     PSA  02/12/2020       COUNSELING    Reviewed preventive health counseling, as reflected in patient instructions    BP Readings from Last 1 Encounters:   06/04/21 122/76     Estimated body mass index is 29.41 kg/m  as calculated from the following:    Height as of this encounter: 1.778 m (5' 10\").    Weight as of this encounter: 93 kg (205 lb).           reports that he has never smoked. He has never used smokeless tobacco.      Counseling Resources:    ATP IV Guidelines  Pooled Cohorts Equation Calculator  FRAX Risk Assessment  ICSI Preventive Guidelines  Dietary Guidelines for Americans, 2010  USDA's MyPlate  ASA Prophylaxis  Lung CA Screening           Anthony Gutiérrez MD, FAAFP     [facility]  [address] [place] [id number]  [email or web address]   Office: [phone]  Fax: [phone]  Pager: [phone]     Answers for HPI/ROS submitted by the patient on 6/4/2021   Annual Exam:  If you checked off any problems, how difficult have these problems made it for you to do your " work, take care of things at home, or get along with other people?: Not difficult at all  PHQ9 TOTAL SCORE: 0  ABI 7 TOTAL SCORE: 0    Answers for HPI/ROS submitted by the patient on 6/4/2021   Annual Exam:  If you checked off any problems, how difficult have these problems made it for you to do your work, take care of things at home, or get along with other people?: Not difficult at all  PHQ9 TOTAL SCORE: 0  ABI 7 TOTAL SCORE: 0    Answers for HPI/ROS submitted by the patient on 6/4/2021   Annual Exam:  If you checked off any problems, how difficult have these problems made it for you to do your work, take care of things at home, or get along with other people?: Not difficult at all  PHQ9 TOTAL SCORE: 0  ABI 7 TOTAL SCORE: 0

## 2021-06-04 NOTE — LETTER
[facility]  [address], [place] [address] [phone]                    June 8, 2021    Obdulio Wallace  [address] [facility] [address]      Dear Obdulio,    Here is a summary of your recent test results:    Labs are overall quite good, except     Low HDL (good cholesterol)     We advise:     Continue current cares.   Balanced low cholesterol diet.   Regular exercise.     Your test results are enclosed.      Thank you very much for trusting [facility].     Healthy regards,          Anthony Gutiérrez M.D.        Results for orders placed or performed in visit on 06/04/21   Comprehensive metabolic panel     Status: Abnormal   Result Value Ref Range    Sodium 139 133 - 144 mmol/L    Potassium 3.9 3.4 - 5.3 mmol/L    Chloride 108 94 - 109 mmol/L    Carbon Dioxide 26 20 - 32 mmol/L    Anion Gap 5 3 - 14 mmol/L    Glucose 96 70 - 99 mg/dL    Urea Nitrogen 17 7 - 30 mg/dL    Creatinine 0.84 0.66 - 1.25 mg/dL    GFR Estimate >90 >60 mL/min/[1.73_m2]    GFR Estimate If Black >90 >60 mL/min/[1.73_m2]    Calcium 8.6 8.5 - 10.1 mg/dL    Bilirubin Total 0.4 0.2 - 1.3 mg/dL    Albumin 3.7 3.4 - 5.0 g/dL    Protein Total 7.4 6.8 - 8.8 g/dL    Alkaline Phosphatase 161 (H) 40 - 150 U/L    ALT 33 0 - 70 U/L    AST 17 0 - 45 U/L   Lipid panel reflex to direct LDL Fasting     Status: Abnormal   Result Value Ref Range    Cholesterol 125 <200 mg/dL    Triglycerides 100 <150 mg/dL    HDL Cholesterol 35 (L) >39 mg/dL    LDL Cholesterol Calculated 70 <100 mg/dL    Non HDL Cholesterol 90 <130 mg/dL   CK total     Status: None   Result Value Ref Range    CK Total 134 30 - 300 U/L   CBC with platelets     Status: None   Result Value Ref Range    WBC 5.7 4.0 - 11.0 10e9/L    RBC Count 4.65 4.4 - 5.9 10e12/L    Hemoglobin 14.1 13.3 - 17.7 g/dL    Hematocrit 41.1 40.0 - 53.0 %    MCV 88 78 - 100 fl    MCH 30.3 26.5 - 33.0 pg    MCHC 34.3 31.5 - 36.5 g/dL    RDW 12.1 10.0 -  15.0 %    Platelet Count 292 150 - 450 10e9/L   TSH with free T4 reflex     Status: None   Result Value Ref Range    TSH 1.97 0.40 - 4.00 mU/L   UA reflex to Microscopic and Culture     Status: None    Specimen: Midstream Urine   Result Value Ref Range    Color Urine Yellow     Appearance Urine Clear     Glucose Urine Negative NEG^Negative mg/dL    Bilirubin Urine Negative NEG^Negative    Ketones Urine Negative NEG^Negative mg/dL    Specific Gravity Urine >1.030 1.003 - 1.035    Blood Urine Negative NEG^Negative    pH Urine 5.5 5.0 - 7.0 pH    Protein Albumin Urine Negative NEG^Negative mg/dL    Urobilinogen Urine 0.2 0.2 - 1.0 EU/dL    Nitrite Urine Negative NEG^Negative    Leukocyte Esterase Urine Negative NEG^Negative    Source Midstream Urine    Albumin Random Urine Quantitative with Creat Ratio     Status: None   Result Value Ref Range    Creatinine Urine 244 mg/dL    Albumin Urine mg/L 17 mg/L    Albumin Urine mg/g Cr 6.97 0 - 17 mg/g Cr   Prostate spec antigen screen     Status: None   Result Value Ref Range    PSA 0.70 0 - 4 ug/L

## 2021-06-05 LAB
ALBUMIN SERPL-MCNC: 3.7 G/DL (ref 3.4–5)
ALP SERPL-CCNC: 161 U/L (ref 40–150)
ALT SERPL W P-5'-P-CCNC: 33 U/L (ref 0–70)
ANION GAP SERPL CALCULATED.3IONS-SCNC: 5 MMOL/L (ref 3–14)
AST SERPL W P-5'-P-CCNC: 17 U/L (ref 0–45)
BILIRUB SERPL-MCNC: 0.4 MG/DL (ref 0.2–1.3)
BUN SERPL-MCNC: 17 MG/DL (ref 7–30)
CALCIUM SERPL-MCNC: 8.6 MG/DL (ref 8.5–10.1)
CHLORIDE SERPL-SCNC: 108 MMOL/L (ref 94–109)
CHOLEST SERPL-MCNC: 125 MG/DL
CK SERPL-CCNC: 134 U/L (ref 30–300)
CO2 SERPL-SCNC: 26 MMOL/L (ref 20–32)
CREAT SERPL-MCNC: 0.84 MG/DL (ref 0.66–1.25)
GFR SERPL CREATININE-BSD FRML MDRD: >90 ML/MIN/{1.73_M2}
GLUCOSE SERPL-MCNC: 96 MG/DL (ref 70–99)
HDLC SERPL-MCNC: 35 MG/DL
LDLC SERPL CALC-MCNC: 70 MG/DL
NONHDLC SERPL-MCNC: 90 MG/DL
POTASSIUM SERPL-SCNC: 3.9 MMOL/L (ref 3.4–5.3)
PROT SERPL-MCNC: 7.4 G/DL (ref 6.8–8.8)
SODIUM SERPL-SCNC: 139 MMOL/L (ref 133–144)
TRIGL SERPL-MCNC: 100 MG/DL
TSH SERPL DL<=0.005 MIU/L-ACNC: 1.97 MU/L (ref 0.4–4)

## 2021-06-05 ASSESSMENT — PATIENT HEALTH QUESTIONNAIRE - PHQ9: SUM OF ALL RESPONSES TO PHQ QUESTIONS 1-9: 0

## 2021-06-05 ASSESSMENT — ANXIETY QUESTIONNAIRES: GAD7 TOTAL SCORE: 0

## 2021-07-30 ENCOUNTER — TRANSFERRED RECORDS (OUTPATIENT)
Dept: HEALTH INFORMATION MANAGEMENT | Facility: CLINIC | Age: 51
End: 2021-07-30

## 2021-08-10 PROBLEM — D12.6 TUBULAR ADENOMA OF COLON: Status: ACTIVE | Noted: 2021-08-10

## 2021-11-05 ENCOUNTER — IMMUNIZATION (OUTPATIENT)
Dept: FAMILY MEDICINE | Facility: CLINIC | Age: 51
End: 2021-11-05
Payer: COMMERCIAL

## 2021-11-05 PROCEDURE — 90682 RIV4 VACC RECOMBINANT DNA IM: CPT

## 2021-11-05 PROCEDURE — 90471 IMMUNIZATION ADMIN: CPT

## 2022-06-12 DIAGNOSIS — I10 HYPERTENSION GOAL BP (BLOOD PRESSURE) < 140/90: ICD-10-CM

## 2022-06-12 DIAGNOSIS — F32.0 MAJOR DEPRESSIVE DISORDER, SINGLE EPISODE, MILD (H): ICD-10-CM

## 2022-06-12 DIAGNOSIS — E78.5 HYPERLIPIDEMIA LDL GOAL <130: ICD-10-CM

## 2022-06-12 DIAGNOSIS — F41.9 ANXIETY: ICD-10-CM

## 2022-06-15 NOTE — TELEPHONE ENCOUNTER
Pt is in need of office or virtual visit prior to refills or lisa refill.  LOV: 11/5/2021    No future appointment scheduled    Routing to [facility] to help schedule Pt.    Gaviota Giles RN

## 2022-06-17 RX ORDER — ATORVASTATIN CALCIUM 80 MG/1
TABLET, FILM COATED ORAL
Qty: 90 TABLET | Refills: 0 | Status: SHIPPED | OUTPATIENT
Start: 2022-06-17 | End: 2022-09-15

## 2022-06-17 RX ORDER — LISINOPRIL AND HYDROCHLOROTHIAZIDE 12.5; 2 MG/1; MG/1
1 TABLET ORAL DAILY
Qty: 90 TABLET | Refills: 0 | Status: SHIPPED | OUTPATIENT
Start: 2022-06-17 | End: 2022-09-15

## 2022-12-05 ENCOUNTER — OFFICE VISIT (OUTPATIENT)
Dept: FAMILY MEDICINE | Facility: CLINIC | Age: 52
End: 2022-12-05
Payer: COMMERCIAL

## 2022-12-05 VITALS
OXYGEN SATURATION: 98 % | WEIGHT: 198 LBS | BODY MASS INDEX: 28.35 KG/M2 | SYSTOLIC BLOOD PRESSURE: 120 MMHG | DIASTOLIC BLOOD PRESSURE: 74 MMHG | TEMPERATURE: 96.5 F | HEIGHT: 70 IN | HEART RATE: 83 BPM

## 2022-12-05 DIAGNOSIS — E78.5 HYPERLIPIDEMIA LDL GOAL <130: ICD-10-CM

## 2022-12-05 DIAGNOSIS — Z00.00 ROUTINE GENERAL MEDICAL EXAMINATION AT A HEALTH CARE FACILITY: Primary | ICD-10-CM

## 2022-12-05 DIAGNOSIS — Z51.81 MEDICATION MONITORING ENCOUNTER: ICD-10-CM

## 2022-12-05 DIAGNOSIS — Z12.11 SCREEN FOR COLON CANCER: ICD-10-CM

## 2022-12-05 DIAGNOSIS — D12.6 TUBULAR ADENOMA OF COLON: ICD-10-CM

## 2022-12-05 DIAGNOSIS — F41.9 ANXIETY: ICD-10-CM

## 2022-12-05 DIAGNOSIS — R21 RASH: ICD-10-CM

## 2022-12-05 DIAGNOSIS — F32.0 MAJOR DEPRESSIVE DISORDER, SINGLE EPISODE, MILD (H): ICD-10-CM

## 2022-12-05 DIAGNOSIS — Z12.5 SCREENING FOR PROSTATE CANCER: ICD-10-CM

## 2022-12-05 DIAGNOSIS — I10 HYPERTENSION GOAL BP (BLOOD PRESSURE) < 140/90: ICD-10-CM

## 2022-12-05 DIAGNOSIS — Z23 NEED FOR TDAP VACCINATION: ICD-10-CM

## 2022-12-05 DIAGNOSIS — Z86.0100 HISTORY OF COLONIC POLYPS: ICD-10-CM

## 2022-12-05 DIAGNOSIS — Z82.49 FAMILY HISTORY OF CORONARY ARTERY DISEASE: ICD-10-CM

## 2022-12-05 LAB
ALBUMIN SERPL BCG-MCNC: 4.8 G/DL (ref 3.5–5.2)
ALBUMIN UR-MCNC: NEGATIVE MG/DL
ALP SERPL-CCNC: 112 U/L (ref 40–129)
ALT SERPL W P-5'-P-CCNC: 41 U/L (ref 10–50)
ANION GAP SERPL CALCULATED.3IONS-SCNC: 12 MMOL/L (ref 7–15)
APPEARANCE UR: CLEAR
AST SERPL W P-5'-P-CCNC: 38 U/L (ref 10–50)
BILIRUB SERPL-MCNC: 0.4 MG/DL
BILIRUB UR QL STRIP: NEGATIVE
BUN SERPL-MCNC: 18.2 MG/DL (ref 6–20)
CALCIUM SERPL-MCNC: 9.7 MG/DL (ref 8.6–10)
CHLORIDE SERPL-SCNC: 102 MMOL/L (ref 98–107)
CHOLEST SERPL-MCNC: 195 MG/DL
CK SERPL-CCNC: 292 U/L (ref 39–308)
COLOR UR AUTO: YELLOW
CREAT SERPL-MCNC: 0.84 MG/DL (ref 0.67–1.17)
CREAT UR-MCNC: 142 MG/DL
DEPRECATED HCO3 PLAS-SCNC: 24 MMOL/L (ref 22–29)
ERYTHROCYTE [DISTWIDTH] IN BLOOD BY AUTOMATED COUNT: 12.3 % (ref 10–15)
GFR SERPL CREATININE-BSD FRML MDRD: >90 ML/MIN/1.73M2
GLUCOSE SERPL-MCNC: 105 MG/DL (ref 70–99)
GLUCOSE UR STRIP-MCNC: NEGATIVE MG/DL
HCT VFR BLD AUTO: 43.1 % (ref 40–53)
HDLC SERPL-MCNC: 42 MG/DL
HGB BLD-MCNC: 15.1 G/DL (ref 13.3–17.7)
HGB UR QL STRIP: NEGATIVE
KETONES UR STRIP-MCNC: NEGATIVE MG/DL
LDLC SERPL CALC-MCNC: 133 MG/DL
LEUKOCYTE ESTERASE UR QL STRIP: NEGATIVE
MCH RBC QN AUTO: 30.7 PG (ref 26.5–33)
MCHC RBC AUTO-ENTMCNC: 35 G/DL (ref 31.5–36.5)
MCV RBC AUTO: 88 FL (ref 78–100)
MICROALBUMIN UR-MCNC: <12 MG/L
MICROALBUMIN/CREAT UR: NORMAL MG/G{CREAT}
NITRATE UR QL: NEGATIVE
NONHDLC SERPL-MCNC: 153 MG/DL
PH UR STRIP: 5.5 [PH] (ref 5–7)
PLATELET # BLD AUTO: 255 10E3/UL (ref 150–450)
POTASSIUM SERPL-SCNC: 3.9 MMOL/L (ref 3.4–5.3)
PROT SERPL-MCNC: 7.5 G/DL (ref 6.4–8.3)
PSA SERPL-MCNC: 0.71 NG/ML (ref 0–3.5)
RBC # BLD AUTO: 4.92 10E6/UL (ref 4.4–5.9)
SODIUM SERPL-SCNC: 138 MMOL/L (ref 136–145)
SP GR UR STRIP: 1.02 (ref 1–1.03)
TRIGL SERPL-MCNC: 99 MG/DL
TSH SERPL DL<=0.005 MIU/L-ACNC: 1.58 UIU/ML (ref 0.3–4.2)
UROBILINOGEN UR STRIP-ACNC: 0.2 E.U./DL
WBC # BLD AUTO: 5.9 10E3/UL (ref 4–11)

## 2022-12-05 PROCEDURE — 80053 COMPREHEN METABOLIC PANEL: CPT | Performed by: FAMILY MEDICINE

## 2022-12-05 PROCEDURE — 81003 URINALYSIS AUTO W/O SCOPE: CPT | Performed by: FAMILY MEDICINE

## 2022-12-05 PROCEDURE — 99396 PREV VISIT EST AGE 40-64: CPT | Mod: 25 | Performed by: FAMILY MEDICINE

## 2022-12-05 PROCEDURE — 85027 COMPLETE CBC AUTOMATED: CPT | Performed by: FAMILY MEDICINE

## 2022-12-05 PROCEDURE — 90471 IMMUNIZATION ADMIN: CPT | Performed by: FAMILY MEDICINE

## 2022-12-05 PROCEDURE — 90715 TDAP VACCINE 7 YRS/> IM: CPT | Performed by: FAMILY MEDICINE

## 2022-12-05 PROCEDURE — 84443 ASSAY THYROID STIM HORMONE: CPT | Performed by: FAMILY MEDICINE

## 2022-12-05 PROCEDURE — 96127 BRIEF EMOTIONAL/BEHAV ASSMT: CPT | Mod: 59 | Performed by: FAMILY MEDICINE

## 2022-12-05 PROCEDURE — 82550 ASSAY OF CK (CPK): CPT | Performed by: FAMILY MEDICINE

## 2022-12-05 PROCEDURE — 82043 UR ALBUMIN QUANTITATIVE: CPT | Performed by: FAMILY MEDICINE

## 2022-12-05 PROCEDURE — 36415 COLL VENOUS BLD VENIPUNCTURE: CPT | Performed by: FAMILY MEDICINE

## 2022-12-05 PROCEDURE — G0103 PSA SCREENING: HCPCS | Performed by: FAMILY MEDICINE

## 2022-12-05 PROCEDURE — 80061 LIPID PANEL: CPT | Performed by: FAMILY MEDICINE

## 2022-12-05 PROCEDURE — 99214 OFFICE O/P EST MOD 30 MIN: CPT | Mod: 25 | Performed by: FAMILY MEDICINE

## 2022-12-05 RX ORDER — LISINOPRIL AND HYDROCHLOROTHIAZIDE 12.5; 2 MG/1; MG/1
1 TABLET ORAL DAILY
Qty: 90 TABLET | Refills: 3 | Status: SHIPPED | OUTPATIENT
Start: 2022-12-05 | End: 2023-12-04

## 2022-12-05 RX ORDER — ATORVASTATIN CALCIUM 80 MG/1
80 TABLET, FILM COATED ORAL DAILY
Qty: 90 TABLET | Refills: 3 | Status: SHIPPED | OUTPATIENT
Start: 2022-12-05 | End: 2023-12-04

## 2022-12-05 RX ORDER — HYDROCORTISONE VALERATE CREAM 2 MG/G
CREAM TOPICAL
Qty: 15 G | Refills: 3 | Status: SHIPPED | OUTPATIENT
Start: 2022-12-05 | End: 2024-01-25

## 2022-12-05 ASSESSMENT — ANXIETY QUESTIONNAIRES
GAD7 TOTAL SCORE: 1
GAD7 TOTAL SCORE: 1
1. FEELING NERVOUS, ANXIOUS, OR ON EDGE: NOT AT ALL
5. BEING SO RESTLESS THAT IT IS HARD TO SIT STILL: NOT AT ALL
2. NOT BEING ABLE TO STOP OR CONTROL WORRYING: NOT AT ALL
3. WORRYING TOO MUCH ABOUT DIFFERENT THINGS: NOT AT ALL
7. FEELING AFRAID AS IF SOMETHING AWFUL MIGHT HAPPEN: NOT AT ALL
6. BECOMING EASILY ANNOYED OR IRRITABLE: SEVERAL DAYS

## 2022-12-05 ASSESSMENT — ENCOUNTER SYMPTOMS
DYSURIA: 0
PARESTHESIAS: 0
CHILLS: 0
SORE THROAT: 0
ABDOMINAL PAIN: 0
CONSTIPATION: 0
FREQUENCY: 0
PALPITATIONS: 0
DIZZINESS: 0
COUGH: 0
NERVOUS/ANXIOUS: 0
HEADACHES: 0
ARTHRALGIAS: 1
EYE PAIN: 0
MYALGIAS: 1
SHORTNESS OF BREATH: 0
JOINT SWELLING: 0
DIARRHEA: 0
NAUSEA: 0
FEVER: 0
HEARTBURN: 0
WEAKNESS: 0
HEMATOCHEZIA: 0
HEMATURIA: 0

## 2022-12-05 ASSESSMENT — PATIENT HEALTH QUESTIONNAIRE - PHQ9
5. POOR APPETITE OR OVEREATING: NOT AT ALL
SUM OF ALL RESPONSES TO PHQ QUESTIONS 1-9: 0

## 2022-12-05 NOTE — PROGRESS NOTES
[facility]  [place]    SUBJECTIVE    CC: Obdulio is an 52 year old who presents for preventative health visit.          Patient has been advised of split billing requirements and indicates understanding: Yes     Healthy Habits:     Getting at least 3 servings of Calcium per day:  NO    Bi-annual eye exam:  NO    Dental care twice a year:  Yes    Sleep apnea or symptoms of sleep apnea:  None    Diet:  Regular (no restrictions)    Frequency of exercise:  4-5 days/week    Duration of exercise:  30-45 minutes    Taking medications regularly:  Yes    Medication side effects:  Not applicable    PHQ-2 Total Score: 0    Additional concerns today:  No    Rt upper anterior chest lesion red, central clearing, 8 x 12 mm    Hyperlipidemia Follow-Up - atorvastatin      Are you regularly taking any medication or supplement to lower your cholesterol?   Yes- atorvastatin (LIPITOR) 80 MG tablet    Are you having muscle aches or other side effects that you think could be caused by your cholesterol lowering medication?  No     Recent Labs   Lab Test 06/04/21  0839 02/12/19  0940 09/09/16  1120 08/18/15  0905   CHOL 125 134   < > 161   HDL 35* 41   < > 39*   LDL 70 69   < > 91   TRIG 100 118   < > 157*   CHOLHDLRATIO  --   --   --  4.1    < > = values in this interval not displayed.     Hypertension Follow-up - lisinopril / hydrochlorothiazide      Do you check your blood pressure regularly outside of the clinic? Yes     Are you following a low salt diet? Yes    Are your blood pressures ever more than 140 on the top number (systolic) OR more   than 90 on the bottom number (diastolic), for example 140/90? No    BP Readings from Last 2 Encounters:   12/05/22 120/74   06/04/21 122/76     Creatinine   Date Value Ref Range Status   06/04/2021 0.84 0.66 - 1.25 mg/dL Final     GFR Estimate   Date Value Ref Range Status   06/04/2021 >90 >60 mL/min/[1.73_m2] Final     Comment:     Non  GFR Calc  Starting 12/18/2018, serum  creatinine based estimated GFR (eGFR) will be   calculated using the Chronic Kidney Disease Epidemiology Collaboration   (CKD-EPI) equation.       Depression and Anxiety Follow-Up - sertaline    How are you doing with your depression since your last visit? No change    How are you doing with your anxiety since your last visit?  No change    Are you having other symptoms that might be associated with depression or anxiety? No    Have you had a significant life event? No     Do you have any concerns with your use of alcohol or other drugs? No    Social History     Tobacco Use     Smoking status: Never     Smokeless tobacco: Never   Vaping Use     Vaping Use: Never used   Substance Use Topics     Alcohol use: Yes     Alcohol/week: 4.0 - 6.0 standard drinks     Types: 4 - 6 Standard drinks or equivalent per week     Comment: 4-6 per week avg     Drug use: No     PHQ 6/4/2021 6/20/2022 12/5/2022   PHQ-9 Total Score 0 0 0   Q9: Thoughts of better off dead/self-harm past 2 weeks Not at all Not at all Not at all     ABI-7 SCORE 6/4/2021 6/20/2022 12/5/2022   Total Score - - -   Total Score 0 (minimal anxiety) 1 (minimal anxiety) -   Total Score 0 1 1     Last PHQ-9 12/5/2022   1.  Little interest or pleasure in doing things 0   2.  Feeling down, depressed, or hopeless 0   3.  Trouble falling or staying asleep, or sleeping too much 0   4.  Feeling tired or having little energy 0   5.  Poor appetite or overeating 0   6.  Feeling bad about yourself 0   7.  Trouble concentrating 0   8.  Moving slowly or restless 0   Q9: Thoughts of better off dead/self-harm past 2 weeks 0   PHQ-9 Total Score 0   Difficulty at work, home, or with people -     ABI-7  12/5/2022   1. Feeling nervous, anxious, or on edge 0   2. Not being able to stop or control worrying 0   3. Worrying too much about different things 0   4. Trouble relaxing 0   5. Being so restless that it is hard to sit still 0   6. Becoming easily annoyed or irritable 1   7.  Feeling afraid, as if something awful might happen 0   ABI-7 Total Score 1   If you checked any problems, how difficult have they made it for you to do your work, take care of things at home, or get along with other people? -     Suicide Assessment Five-step Evaluation and Treatment (SAFE-T)      Social History     Tobacco Use     Smoking status: Never     Smokeless tobacco: Never   Substance Use Topics     Alcohol use: Yes     Alcohol/week: 4.0 - 6.0 standard drinks     Types: 4 - 6 Standard drinks or equivalent per week     Comment: 4-6 per week avg     If you drink alcohol do you typically have >3 drinks per day or >7 drinks per week? No    Alcohol Use 12/5/2022   Prescreen: >3 drinks/day or >7 drinks/week? No   Prescreen: >3 drinks/day or >7 drinks/week? -   No flowsheet data found.    Last PSA:   PSA   Date Value Ref Range Status   06/04/2021 0.70 0 - 4 ug/L Final     Comment:     Assay Method:  Chemiluminescence using Siemens Vista analyzer     Reviewed orders with patient. Reviewed health maintenance and updated orders accordingly - Yes    Reviewed and updated as needed this visit by clinical staff   Tobacco  Allergies  Meds            Reviewed and updated as needed this visit by Provider                   Review of Systems   Constitutional: Negative for chills and fever.   HENT: Negative for congestion, ear pain, hearing loss and sore throat.    Eyes: Negative for pain and visual disturbance.   Respiratory: Negative for cough and shortness of breath.    Cardiovascular: Negative for chest pain, palpitations and peripheral edema.   Gastrointestinal: Negative for abdominal pain, constipation, diarrhea, heartburn, hematochezia and nausea.   Genitourinary: Negative for dysuria, frequency, genital sores, hematuria, impotence, penile discharge and urgency.   Musculoskeletal: Positive for arthralgias and myalgias. Negative for joint swelling.   Skin: Negative for rash.   Neurological: Negative for dizziness,  weakness, headaches and paresthesias.   Psychiatric/Behavioral: Negative for mood changes. The patient is not nervous/anxious.      Health Maintenance     Colonoscopy:  Due 7/2026   FIT:  given              PSA:  pending   DEXA:  NA    Health Maintenance Due   Topic Date Due     DTAP/TDAP/TD IMMUNIZATION (3 - Td or Tdap) 03/02/2022     BMP  06/04/2022     LIPID  06/04/2022     MICROALBUMIN  06/04/2022     PSA  06/04/2022       Current Problem List    Patient Active Problem List   Diagnosis     Hyperlipidemia LDL goal <130     Hypertension goal BP (blood pressure) < 140/90     Major depressive disorder, single episode, mild (H)     Anxiety     Family history of coronary artery disease     Tubular adenoma of colon - 7/2021 - repeat colonoscopy 5 years     History of colonic polyps       Past Medical History    Past Medical History:   Diagnosis Date     Anxiety 01/01/2014     History of colonic polyps 07/2021     Hyperlipidemia LDL goal <130 01/01/2001     Hypertension goal BP (blood pressure) < 140/90 01/01/2001     Major depressive disorder, single episode, mild (H) 01/01/2014     Mild intermittent asthma childhood    resolved     Tubular adenoma of colon - 7/2021 - repeat colonoscopy 5 years 8/10/2021       Past Surgical History    Past Surgical History:   Procedure Laterality Date     COLONOSCOPY  07/2021    polyps - due 5 yrs     STRESS ECHO (METRO)  1/09, 10/15    normal except increased bp     SURGICAL HISTORY OF -   01/01/2001    jaw realignment - misbite     ZZC LASIK (EMP) W OPT MYOPIA P1  01/01/2004       Current Medications    Current Outpatient Medications   Medication Sig Dispense Refill     ADVIL 200 MG OR TABS 1 TABLET EVERY 4 TO 6 HOURS AS NEEDED 120 0     ASPIRIN 81 MG OR TABS ONE DAILY 100 3     atorvastatin (LIPITOR) 80 MG tablet Take 1 tablet (80 mg) by mouth daily 90 tablet 3     EXCEDRIN 250-250-65 MG OR TABS 2 TABLETS EVERY 6 HOURS AS NEEDED 80 0     HEMP OIL OR EXTRACT OR OTHER CBD CANNABINOID,  NOT MEDICAL CANNABIS, PRN at Bedtime       hydrocortisone (WESTCORT) 0.2 % external cream Apply sparingly to affected area three times daily for 14 days. 15 g 3     lisinopril-hydrochlorothiazide (ZESTORETIC) 20-12.5 MG tablet Take 1 tablet by mouth daily 90 tablet 3     sertraline (ZOLOFT) 50 MG tablet Take 1 tablet (50 mg) by mouth daily 90 tablet 3       Allergies    No Known Allergies    Immunizations    Immunization History   Administered Date(s) Administered     COVID-19 Vaccine 12+ (Pfizer ) 2022     COVID-19 Vaccine 12+ (Pfizer) 2021, 2021, 2021     COVID-19 Vaccine Bivalent Booster 18+ (Moderna) 10/24/2022     Influenza (IIV3) PF 2009, 2010, 10/01/2011, 2012, 10/01/2015     Influenza Vaccine 50-64 or 18-64 w/egg allergy (Flublok) 2021     Influenza,INJ,MDCK,PF,Quad >6mo(Flucelvax) 10/28/2022     Pneumococcal 23 valent 2005     TD (ADULT, 7+) 2005     TDAP Vaccine (Boostrix) 2012     Td (Adult), Adsorbed 2005     Twinrix A/B 2005, 2006, 2009     Zoster vaccine recombinant adjuvanted (SHINGRIX) 2022, 10/02/2022       Family History    Family History   Problem Relation Age of Onset     Hypertension Mother      Lipids Mother      C.A.D. Mother      Hypertension Father          at age 63     Lipids Father         triglycerides     C.A.D. Father      C.A.D. Maternal Grandmother      Lung Cancer Maternal Grandfather         smoker     Chronic Obstructive Pulmonary Disease Paternal Grandmother         smoker       Social History    Social History     Socioeconomic History     Marital status:      Spouse name: Nicolasa     Number of children: 0     Years of education: 16     Highest education level: Not on file   Occupational History     Employer: [employer]   Tobacco Use     Smoking status: Never     Smokeless tobacco: Never   Vaping Use     Vaping Use: Never used   Substance and Sexual Activity      Alcohol use: Yes     Alcohol/week: 4.0 - 6.0 standard drinks     Types: 4 - 6 Standard drinks or equivalent per week     Comment: 4-6 per week avg     Drug use: No     Sexual activity: Yes     Partners: Female   Other Topics Concern      Service Not Asked     Blood Transfusions Not Asked     Caffeine Concern Yes     Comment: 2 cups qd     Occupational Exposure Not Asked     Hobby Hazards Not Asked     Sleep Concern Not Asked     Stress Concern Not Asked     Weight Concern Not Asked     Special Diet Not Asked     Back Care Not Asked     Exercise Yes     Comment: 3-4 times per week, walks 6-7 times per week     Bike Helmet Not Asked     Seat Belt Yes     Self-Exams Not Asked     Parent/sibling w/ CABG, MI or angioplasty before 65F 55M? Yes   Social History Narrative     Not on file     Social Determinants of Health     Financial Resource Strain: Not on file   Food Insecurity: Not on file   Transportation Needs: Not on file   Physical Activity: Not on file   Stress: Not on file   Social Connections: Not on file   Intimate Partner Violence: Not on file   Housing Stability: Not on file       ROS    CONSTITUTIONAL: NEGATIVE for fever, chills, change in weight  INTEGUMENTARY/SKIN: NEGATIVE for worrisome rashes, moles or lesions  EYES: NEGATIVE for vision changes or irritation  ENT/MOUTH: NEGATIVE for ear, mouth and throat problems  RESP: NEGATIVE for significant cough or SOB  BREAST: NEGATIVE for masses, tenderness or discharge  CV: NEGATIVE for chest pain, palpitations or peripheral edema  GI: NEGATIVE for nausea, abdominal pain, heartburn, or change in bowel habits  : NEGATIVE for frequency, dysuria, or hematuria  MUSCULOSKELETAL: NEGATIVE for significant arthralgias or myalgia  NEURO: NEGATIVE for weakness, dizziness or paresthesias  ENDOCRINE: NEGATIVE for temperature intolerance, skin/hair changes  HEME: NEGATIVE for bleeding problems  PSYCHIATRIC: NEGATIVE for changes in mood or  "affect    OBJECTIVE    /74 (BP Location: Right arm, Patient Position: Sitting, Cuff Size: Adult Large)   Pulse 83   Temp (!) 96.5  F (35.8  C) (Tympanic)   Ht 1.772 m (5' 9.75\")   Wt 89.8 kg (198 lb)   SpO2 98%   BMI 28.61 kg/m      EXAM:    GENERAL: healthy, alert and no distress  EYES: Eyes grossly normal to inspection, PERRL and conjunctivae and sclerae normal  HENT: ear canals and TM's normal, nose and mouth without ulcers or lesions  NECK: no adenopathy, no asymmetry, masses, or scars and thyroid normal to palpation  RESP: lungs clear to auscultation - no rales, rhonchi or wheezes  CV: regular rate and rhythm, normal S1 S2, no S3 or S4, no murmur, click or rub, no peripheral edema and peripheral pulses strong  ABDOMEN: soft, nontender, no hepatosplenomegaly, no masses and bowel sounds normal   (male): testicles normal without atrophy or masses, no hernias and penis normal without urethral discharge  RECTAL: normal sphincter tone, no rectal masses and prostate of normal size for age, smooth, nontender without masses/nodules  MS: no gross musculoskeletal defects noted, no edema  SKIN: no suspicious lesions or rashes  NEURO: Normal strength and tone, mentation intact and speech normal  PSYCH: mentation appears normal, affect normal/bright  LYMPH: no cervical, supraclavicular, axillary, or inguinal adenopathy    DIAGNOSTICS/PROCEDURES    Pending    ASSESSMENT      ICD-10-CM    1. Routine general medical examination at a health care facility  Z00.00 Comprehensive metabolic panel     Lipid panel reflex to direct LDL Fasting     CBC with platelets     CK total     UA reflex to Microscopic and Culture     Albumin Random Urine Quantitative with Creat Ratio     TSH with free T4 reflex     Prostate Specific Antigen Screen     Fecal colorectal cancer screen FIT     REVIEW OF HEALTH MAINTENANCE PROTOCOL ORDERS     Fecal colorectal cancer screen FIT     Comprehensive metabolic panel     Lipid panel reflex to " direct LDL Fasting     CBC with platelets     CK total     UA reflex to Microscopic and Culture     Albumin Random Urine Quantitative with Creat Ratio     TSH with free T4 reflex     Prostate Specific Antigen Screen      2. Hypertension goal BP (blood pressure) < 140/90  I10 Comprehensive metabolic panel     UA reflex to Microscopic and Culture     Albumin Random Urine Quantitative with Creat Ratio     REVIEW OF HEALTH MAINTENANCE PROTOCOL ORDERS     lisinopril-hydrochlorothiazide (ZESTORETIC) 20-12.5 MG tablet     OFFICE/OUTPT VISIT,EST,LEVL IV      3. Hyperlipidemia LDL goal <130  E78.5 Comprehensive metabolic panel     Lipid panel reflex to direct LDL Fasting     CK total     REVIEW OF HEALTH MAINTENANCE PROTOCOL ORDERS     atorvastatin (LIPITOR) 80 MG tablet     OFFICE/OUTPT VISIT,EST,LEVL IV      4. Family history of coronary artery disease  Z82.49 Comprehensive metabolic panel     Lipid panel reflex to direct LDL Fasting     Albumin Random Urine Quantitative with Creat Ratio     REVIEW OF HEALTH MAINTENANCE PROTOCOL ORDERS     OFFICE/OUTPT VISIT,EST,LEVL IV      5. Major depressive disorder, single episode, mild (H)  F32.0 TSH with free T4 reflex     REVIEW OF HEALTH MAINTENANCE PROTOCOL ORDERS     sertraline (ZOLOFT) 50 MG tablet     OFFICE/OUTPT VISIT,EST,LEVL IV      6. Anxiety  F41.9 TSH with free T4 reflex     REVIEW OF HEALTH MAINTENANCE PROTOCOL ORDERS     sertraline (ZOLOFT) 50 MG tablet     OFFICE/OUTPT VISIT,EST,LEVL IV      7. Rash  R21 REVIEW OF HEALTH MAINTENANCE PROTOCOL ORDERS     hydrocortisone (WESTCORT) 0.2 % external cream     OFFICE/OUTPT VISIT,EST,LEVL IV      8. Tubular adenoma of colon - 7/2021 - repeat colonoscopy 5 years  D12.6 Fecal colorectal cancer screen FIT     REVIEW OF HEALTH MAINTENANCE PROTOCOL ORDERS     OFFICE/OUTPT VISIT,EST,LEVL IV      9. History of colonic polyps  Z86.010 REVIEW OF HEALTH MAINTENANCE PROTOCOL ORDERS     OFFICE/OUTPT VISIT,EST,LEVL IV      10. Screening  for prostate cancer  Z12.5 Prostate Specific Antigen Screen     REVIEW OF HEALTH MAINTENANCE PROTOCOL ORDERS     OFFICE/OUTPT VISIT,EST,LEVL IV      11. Screen for colon cancer  Z12.11 Fecal colorectal cancer screen FIT     REVIEW OF HEALTH MAINTENANCE PROTOCOL ORDERS     OFFICE/OUTPT VISIT,EST,LEVL IV      12. Medication monitoring encounter  Z51.81 Comprehensive metabolic panel     Lipid panel reflex to direct LDL Fasting     CBC with platelets     CK total     UA reflex to Microscopic and Culture     Albumin Random Urine Quantitative with Creat Ratio     TSH with free T4 reflex     REVIEW OF HEALTH MAINTENANCE PROTOCOL ORDERS     OFFICE/OUTPT VISIT,EST,LEVL IV      13. Need for Tdap vaccination  Z23 TDAP VACCINE (Adacel, Boostrix)     REVIEW OF HEALTH MAINTENANCE PROTOCOL ORDERS     OFFICE/OUTPT VISIT,EST,LEVL IV          PLAN    Discussed treatment/modality options, including risk and benefits, he desires:    advised alcohol consumption 1oz per day or less, advised aspirin 81 mg po daily, advised 1 multivitamin per day, advised calcium 2826-9401 mg/d and Vitamin D 800-1200 IU/d, advised dentist every 6 months, advised diet and exercise, advised opthalmologist every 1-2 years, advised self testicular exam q month, further health care maintenance, further lab(s), immunization(s), medication refill(s), ABI 7, completed and reviewed, PHQ-9, Depression Action Plan, completed and reviewed and observation    Discussed controversies surrounding PSA. Specifically reviewed 2017 USPSTF findings recommending discussion of PSA testing for men ages 55-69.  Reviewed findings of the  Randomized Study of Screening for Prostate Cancer which showed a 30% reduction in advanced stage prostate cancer and a 20% reduction in death rate from prostate cancer in this age group. PSA-based screening may prevent up to 2 deaths and up to 3 cases of metastatic disease per 1,000 men screened over 13 years.    We've elected to do PSA  "this year after discussing these controversies.    All diagnosis above reviewed and noted above, otherwise stable.      See [employer] orders for further details.      1) med refills    2) labs pending     3) immunizations - TdaP    Return in about 1 year (around 12/5/2023) for Complete Physical, Medication Recheck Visit, Follow Up Chronic.    Health Maintenance Due   Topic Date Due     DTAP/TDAP/TD IMMUNIZATION (3 - Td or Tdap) 03/02/2022     BMP  06/04/2022     LIPID  06/04/2022     MICROALBUMIN  06/04/2022     PSA  06/04/2022       COUNSELING    Reviewed preventive health counseling, as reflected in patient instructions    BP Readings from Last 1 Encounters:   12/05/22 120/74     Estimated body mass index is 28.61 kg/m  as calculated from the following:    Height as of this encounter: 1.772 m (5' 9.75\").    Weight as of this encounter: 89.8 kg (198 lb).           reports that he has never smoked. He has never used smokeless tobacco.      Counseling Resources:    ATP IV Guidelines  Pooled Cohorts Equation Calculator  FRAX Risk Assessment  ICSI Preventive Guidelines  Dietary Guidelines for Americans, 2010  USDA's MyPlate  ASA Prophylaxis  Lung CA Screening           Anthony Gutiérrez MD, FAAFP     [facility]  [address] [place] [id number]  [email or web address]   Office: [phone]  Fax: [phone]  Pager: [phone]     "

## 2022-12-05 NOTE — NURSING NOTE
Prior to immunization administration, verified patients identity using patient s name and date of birth. Please see Immunization Activity for additional information.     Screening Questionnaire for Adult Immunization    Are you sick today?   No   Do you have allergies to medications, food, a vaccine component or latex?   No   Have you ever had a serious reaction after receiving a vaccination?   No   Do you have a long-term health problem with heart, lung, kidney, or metabolic disease (e.g., diabetes), asthma, a blood disorder, no spleen, complement component deficiency, a cochlear implant, or a spinal fluid leak?  Are you on long-term aspirin therapy?   No   Do you have cancer, leukemia, HIV/AIDS, or any other immune system problem?   No   Do you have a parent, brother, or sister with an immune system problem?   No   In the past 3 months, have you taken medications that affect  your immune system, such as prednisone, other steroids, or anticancer drugs; drugs for the treatment of rheumatoid arthritis, Crohn s disease, or psoriasis; or have you had radiation treatments?   No   Have you had a seizure, or a brain or other nervous system problem?   No   During the past year, have you received a transfusion of blood or blood    products, or been given immune (gamma) globulin or antiviral drug?   No   For women: Are you pregnant or is there a chance you could become       pregnant during the next month?   No   Have you received any vaccinations in the past 4 weeks?   No     Immunization questionnaire answers were all negative.        Per orders of Dr. Ty, injection of TDAP given by Jimena Martin CMA. Patient instructed to remain in clinic for 15 minutes afterwards, and to report any adverse reaction to me immediately.       Screening performed by Jimena Martin CMA on 12/5/2022 at 11:31 AM.

## 2022-12-12 DIAGNOSIS — R73.09 ELEVATED GLUCOSE: ICD-10-CM

## 2022-12-12 DIAGNOSIS — E78.5 HYPERLIPIDEMIA LDL GOAL <130: Primary | ICD-10-CM

## 2023-11-06 ENCOUNTER — PATIENT OUTREACH (OUTPATIENT)
Dept: CARE COORDINATION | Facility: CLINIC | Age: 53
End: 2023-11-06
Payer: COMMERCIAL

## 2023-11-20 ENCOUNTER — PATIENT OUTREACH (OUTPATIENT)
Dept: CARE COORDINATION | Facility: CLINIC | Age: 53
End: 2023-11-20
Payer: COMMERCIAL

## 2023-11-30 ENCOUNTER — PATIENT OUTREACH (OUTPATIENT)
Dept: GASTROENTEROLOGY | Facility: CLINIC | Age: 53
End: 2023-11-30
Payer: COMMERCIAL

## 2023-12-04 DIAGNOSIS — F41.9 ANXIETY: ICD-10-CM

## 2023-12-04 DIAGNOSIS — E78.5 HYPERLIPIDEMIA LDL GOAL <130: ICD-10-CM

## 2023-12-04 DIAGNOSIS — F32.0 MAJOR DEPRESSIVE DISORDER, SINGLE EPISODE, MILD (H): ICD-10-CM

## 2023-12-04 DIAGNOSIS — I10 HYPERTENSION GOAL BP (BLOOD PRESSURE) < 140/90: ICD-10-CM

## 2023-12-04 RX ORDER — LISINOPRIL AND HYDROCHLOROTHIAZIDE 12.5; 2 MG/1; MG/1
1 TABLET ORAL DAILY
Qty: 90 TABLET | Refills: 0 | Status: SHIPPED | OUTPATIENT
Start: 2023-12-04 | End: 2024-01-25

## 2023-12-04 RX ORDER — ATORVASTATIN CALCIUM 80 MG/1
80 TABLET, FILM COATED ORAL DAILY
Qty: 90 TABLET | Refills: 0 | Status: SHIPPED | OUTPATIENT
Start: 2023-12-04 | End: 2024-01-25

## 2023-12-05 NOTE — TELEPHONE ENCOUNTER
Rx done due for cpx fasting    BP Readings from Last 3 Encounters:   12/05/22 120/74   06/04/21 122/76   02/12/19 124/72     Next 5 appointments (look out 90 days)      Jan 25, 2024 10:00 AM  (Arrive by 9:40 AM)  Adult Preventative Visit with Anthony Gutiérrez MD  [facility] ([facility] [place] ) [address] [place] [address] [phone]

## 2024-01-20 ASSESSMENT — ENCOUNTER SYMPTOMS
NAUSEA: 0
WEAKNESS: 0
NERVOUS/ANXIOUS: 0
FEVER: 0
PALPITATIONS: 0
SORE THROAT: 0
PARESTHESIAS: 0
DIARRHEA: 0
HEARTBURN: 0
COUGH: 0
HEMATURIA: 0
MYALGIAS: 0
DYSURIA: 0
HEMATOCHEZIA: 0
FREQUENCY: 0
EYE PAIN: 0
ARTHRALGIAS: 0
CHILLS: 0
ABDOMINAL PAIN: 0
CONSTIPATION: 0
DIZZINESS: 0
HEADACHES: 0
SHORTNESS OF BREATH: 0
JOINT SWELLING: 0

## 2024-01-25 ENCOUNTER — OFFICE VISIT (OUTPATIENT)
Dept: FAMILY MEDICINE | Facility: CLINIC | Age: 54
End: 2024-01-25
Payer: COMMERCIAL

## 2024-01-25 VITALS
BODY MASS INDEX: 28.49 KG/M2 | SYSTOLIC BLOOD PRESSURE: 122 MMHG | DIASTOLIC BLOOD PRESSURE: 78 MMHG | OXYGEN SATURATION: 96 % | RESPIRATION RATE: 16 BRPM | TEMPERATURE: 97.5 F | HEIGHT: 70 IN | HEART RATE: 93 BPM | WEIGHT: 199 LBS

## 2024-01-25 DIAGNOSIS — D12.6 TUBULAR ADENOMA OF COLON: ICD-10-CM

## 2024-01-25 DIAGNOSIS — I10 HYPERTENSION GOAL BP (BLOOD PRESSURE) < 140/90: ICD-10-CM

## 2024-01-25 DIAGNOSIS — Z00.00 ROUTINE GENERAL MEDICAL EXAMINATION AT A HEALTH CARE FACILITY: Primary | ICD-10-CM

## 2024-01-25 DIAGNOSIS — Z82.49 FAMILY HISTORY OF CORONARY ARTERY DISEASE: ICD-10-CM

## 2024-01-25 DIAGNOSIS — F32.0 MAJOR DEPRESSIVE DISORDER, SINGLE EPISODE, MILD (H): ICD-10-CM

## 2024-01-25 DIAGNOSIS — R21 RASH: ICD-10-CM

## 2024-01-25 DIAGNOSIS — Z86.0100 HISTORY OF COLONIC POLYPS: ICD-10-CM

## 2024-01-25 DIAGNOSIS — Z51.81 MEDICATION MONITORING ENCOUNTER: ICD-10-CM

## 2024-01-25 DIAGNOSIS — E78.5 HYPERLIPIDEMIA LDL GOAL <130: ICD-10-CM

## 2024-01-25 DIAGNOSIS — Z12.11 SCREEN FOR COLON CANCER: ICD-10-CM

## 2024-01-25 DIAGNOSIS — Z12.5 SCREENING FOR PROSTATE CANCER: ICD-10-CM

## 2024-01-25 DIAGNOSIS — F41.9 ANXIETY: ICD-10-CM

## 2024-01-25 LAB
ALBUMIN SERPL BCG-MCNC: 4.9 G/DL (ref 3.5–5.2)
ALBUMIN UR-MCNC: ABNORMAL MG/DL
ALP SERPL-CCNC: 104 U/L (ref 40–150)
ALT SERPL W P-5'-P-CCNC: 43 U/L (ref 0–70)
ANION GAP SERPL CALCULATED.3IONS-SCNC: 11 MMOL/L (ref 7–15)
APPEARANCE UR: CLEAR
AST SERPL W P-5'-P-CCNC: 37 U/L (ref 0–45)
BACTERIA #/AREA URNS HPF: ABNORMAL /HPF
BILIRUB SERPL-MCNC: 0.5 MG/DL
BILIRUB UR QL STRIP: NEGATIVE
BUN SERPL-MCNC: 15 MG/DL (ref 6–20)
CALCIUM SERPL-MCNC: 9.8 MG/DL (ref 8.6–10)
CHLORIDE SERPL-SCNC: 101 MMOL/L (ref 98–107)
CHOLEST SERPL-MCNC: 145 MG/DL
CK SERPL-CCNC: 163 U/L (ref 39–308)
COLOR UR AUTO: YELLOW
CREAT SERPL-MCNC: 0.85 MG/DL (ref 0.67–1.17)
DEPRECATED HCO3 PLAS-SCNC: 26 MMOL/L (ref 22–29)
EGFRCR SERPLBLD CKD-EPI 2021: >90 ML/MIN/1.73M2
ERYTHROCYTE [DISTWIDTH] IN BLOOD BY AUTOMATED COUNT: 12.3 % (ref 10–15)
FASTING STATUS PATIENT QL REPORTED: YES
GLUCOSE SERPL-MCNC: 103 MG/DL (ref 70–99)
GLUCOSE UR STRIP-MCNC: NEGATIVE MG/DL
HCT VFR BLD AUTO: 44.7 % (ref 40–53)
HDLC SERPL-MCNC: 40 MG/DL
HGB BLD-MCNC: 15.5 G/DL (ref 13.3–17.7)
HGB UR QL STRIP: NEGATIVE
KETONES UR STRIP-MCNC: NEGATIVE MG/DL
LDLC SERPL CALC-MCNC: 79 MG/DL
LEUKOCYTE ESTERASE UR QL STRIP: NEGATIVE
MCH RBC QN AUTO: 31 PG (ref 26.5–33)
MCHC RBC AUTO-ENTMCNC: 34.7 G/DL (ref 31.5–36.5)
MCV RBC AUTO: 89 FL (ref 78–100)
NITRATE UR QL: NEGATIVE
NONHDLC SERPL-MCNC: 105 MG/DL
PH UR STRIP: 7.5 [PH] (ref 5–7)
PLATELET # BLD AUTO: 275 10E3/UL (ref 150–450)
POTASSIUM SERPL-SCNC: 4.2 MMOL/L (ref 3.4–5.3)
PROT SERPL-MCNC: 7.7 G/DL (ref 6.4–8.3)
PSA SERPL DL<=0.01 NG/ML-MCNC: 0.97 NG/ML (ref 0–3.5)
RBC # BLD AUTO: 5 10E6/UL (ref 4.4–5.9)
RBC #/AREA URNS AUTO: ABNORMAL /HPF
SODIUM SERPL-SCNC: 138 MMOL/L (ref 135–145)
SP GR UR STRIP: 1.02 (ref 1–1.03)
SQUAMOUS #/AREA URNS AUTO: ABNORMAL /LPF
TRIGL SERPL-MCNC: 128 MG/DL
TSH SERPL DL<=0.005 MIU/L-ACNC: 1.83 UIU/ML (ref 0.3–4.2)
UROBILINOGEN UR STRIP-ACNC: 0.2 E.U./DL
WBC # BLD AUTO: 6.1 10E3/UL (ref 4–11)
WBC #/AREA URNS AUTO: ABNORMAL /HPF

## 2024-01-25 PROCEDURE — 81001 URINALYSIS AUTO W/SCOPE: CPT | Performed by: FAMILY MEDICINE

## 2024-01-25 PROCEDURE — 80053 COMPREHEN METABOLIC PANEL: CPT | Performed by: FAMILY MEDICINE

## 2024-01-25 PROCEDURE — 99396 PREV VISIT EST AGE 40-64: CPT | Performed by: FAMILY MEDICINE

## 2024-01-25 PROCEDURE — 82570 ASSAY OF URINE CREATININE: CPT | Performed by: FAMILY MEDICINE

## 2024-01-25 PROCEDURE — 82043 UR ALBUMIN QUANTITATIVE: CPT | Performed by: FAMILY MEDICINE

## 2024-01-25 PROCEDURE — 80061 LIPID PANEL: CPT | Performed by: FAMILY MEDICINE

## 2024-01-25 PROCEDURE — 84443 ASSAY THYROID STIM HORMONE: CPT | Performed by: FAMILY MEDICINE

## 2024-01-25 PROCEDURE — 82550 ASSAY OF CK (CPK): CPT | Performed by: FAMILY MEDICINE

## 2024-01-25 PROCEDURE — G0103 PSA SCREENING: HCPCS | Performed by: FAMILY MEDICINE

## 2024-01-25 PROCEDURE — 99214 OFFICE O/P EST MOD 30 MIN: CPT | Mod: 25 | Performed by: FAMILY MEDICINE

## 2024-01-25 PROCEDURE — 85027 COMPLETE CBC AUTOMATED: CPT | Performed by: FAMILY MEDICINE

## 2024-01-25 PROCEDURE — 36415 COLL VENOUS BLD VENIPUNCTURE: CPT | Performed by: FAMILY MEDICINE

## 2024-01-25 RX ORDER — HYDROCORTISONE VALERATE CREAM 2 MG/G
CREAM TOPICAL
Qty: 15 G | Refills: 3 | Status: SHIPPED | OUTPATIENT
Start: 2024-01-25

## 2024-01-25 RX ORDER — ATORVASTATIN CALCIUM 80 MG/1
80 TABLET, FILM COATED ORAL DAILY
Qty: 90 TABLET | Refills: 3 | Status: SHIPPED | OUTPATIENT
Start: 2024-01-25

## 2024-01-25 RX ORDER — LISINOPRIL AND HYDROCHLOROTHIAZIDE 12.5; 2 MG/1; MG/1
1 TABLET ORAL DAILY
Qty: 90 TABLET | Refills: 3 | Status: SHIPPED | OUTPATIENT
Start: 2024-01-25

## 2024-01-25 ASSESSMENT — ENCOUNTER SYMPTOMS
CONSTIPATION: 0
DIARRHEA: 0
SORE THROAT: 0
FREQUENCY: 0
NERVOUS/ANXIOUS: 0
HEMATURIA: 0
WEAKNESS: 0
NAUSEA: 0
HEADACHES: 0
ARTHRALGIAS: 0
COUGH: 0
EYE PAIN: 0
DIZZINESS: 0
PALPITATIONS: 0
DYSURIA: 0
FEVER: 0
JOINT SWELLING: 0
SHORTNESS OF BREATH: 0
CHILLS: 0
ABDOMINAL PAIN: 0
MYALGIAS: 0

## 2024-01-25 ASSESSMENT — PATIENT HEALTH QUESTIONNAIRE - PHQ9
SUM OF ALL RESPONSES TO PHQ QUESTIONS 1-9: 0
SUM OF ALL RESPONSES TO PHQ QUESTIONS 1-9: 0

## 2024-01-25 ASSESSMENT — ANXIETY QUESTIONNAIRES
7. FEELING AFRAID AS IF SOMETHING AWFUL MIGHT HAPPEN: NOT AT ALL
3. WORRYING TOO MUCH ABOUT DIFFERENT THINGS: NOT AT ALL
GAD7 TOTAL SCORE: 0
5. BEING SO RESTLESS THAT IT IS HARD TO SIT STILL: NOT AT ALL
2. NOT BEING ABLE TO STOP OR CONTROL WORRYING: NOT AT ALL
GAD7 TOTAL SCORE: 0
IF YOU CHECKED OFF ANY PROBLEMS ON THIS QUESTIONNAIRE, HOW DIFFICULT HAVE THESE PROBLEMS MADE IT FOR YOU TO DO YOUR WORK, TAKE CARE OF THINGS AT HOME, OR GET ALONG WITH OTHER PEOPLE: NOT DIFFICULT AT ALL
7. FEELING AFRAID AS IF SOMETHING AWFUL MIGHT HAPPEN: NOT AT ALL
6. BECOMING EASILY ANNOYED OR IRRITABLE: NOT AT ALL
GAD7 TOTAL SCORE: 0
1. FEELING NERVOUS, ANXIOUS, OR ON EDGE: NOT AT ALL
4. TROUBLE RELAXING: NOT AT ALL

## 2024-01-25 NOTE — PROGRESS NOTES
Preventive Care Visit    [facility] [place]  Anthony Gutiérrez MD, Family Medicine  Jan 25, 2024      Obdulio is a 54 year old, presenting for the following:    Physical        1/25/2024     9:52 AM   Additional Questions   Roomed by Elvira HAY     Healthy Habits:     Getting at least 3 servings of Calcium per day:  Yes    Bi-annual eye exam:  NO    Dental care twice a year:  Yes    Sleep apnea or symptoms of sleep apnea:  None    Diet:  Regular (no restrictions)    Frequency of exercise:  4-5 days/week    Duration of exercise:  30-45 minutes    Taking medications regularly:  Yes    Medication side effects:  None    Additional concerns today:  No    Hyperlipidemia Follow-Up    Are you regularly taking any medication or supplement to lower your cholesterol?   Yes- Lipitor   Are you having muscle aches or other side effects that you think could be caused by your cholesterol lowering medication?  No    Recent Labs   Lab Test 12/05/22  1025 06/04/21  0839   CHOL 195 125   HDL 42 35*   * 70   TRIG 99 100     Hypertension Follow-up    Do you check your blood pressure regularly outside of the clinic? Yes   Are you following a low salt diet? Yes  Are your blood pressures ever more than 140 on the top number (systolic) OR more   than 90 on the bottom number (diastolic), for example 140/90? No    BP Readings from Last 3 Encounters:   01/25/24 122/78   12/05/22 120/74   06/04/21 122/76     Creatinine   Date Value Ref Range Status   12/05/2022 0.84 0.67 - 1.17 mg/dL Final   06/04/2021 0.84 0.66 - 1.25 mg/dL Final     GFR Estimate   Date Value Ref Range Status   12/05/2022 >90 >60 mL/min/1.73m2 Final     Comment:     Effective December 21, 2021 eGFRcr in adults is calculated using the 2021 CKD-EPI creatinine equation which includes age and gender (Noe et al., NEJM, DOI: 10.1056/EGMOvv6650672)   06/04/2021 >90 >60 mL/min/[1.73_m2] Final     Comment:     Non  GFR Calc  Starting 12/18/2018, serum  creatinine based estimated GFR (eGFR) will be   calculated using the Chronic Kidney Disease Epidemiology Collaboration   (CKD-EPI) equation.       Depression / Anxiety        6/20/2022    10:10 PM 12/5/2022    10:18 AM 1/25/2024     9:53 AM   PHQ   PHQ-9 Total Score 0 0 0   Q9: Thoughts of better off dead/self-harm past 2 weeks Not at all Not at all Not at all           6/20/2022    10:09 PM 12/5/2022    10:18 AM 1/25/2024     9:54 AM   ABI-7 SCORE   Total Score 1 (minimal anxiety)  0 (minimal anxiety)   Total Score 1 1 0     FH CAD    Hx of colon polyps - due 7/2026    Social History     Tobacco Use    Smoking status: Never    Smokeless tobacco: Never   Substance Use Topics    Alcohol use: Not Currently     Alcohol/week: 0.0 - 1.0 standard drinks of alcohol             1/20/2024     6:18 PM   Alcohol Use   Prescreen: >3 drinks/day or >7 drinks/week? No          No data to display                Last PSA:   PSA   Date Value Ref Range Status   06/04/2021 0.70 0 - 4 ug/L Final     Comment:     Assay Method:  Chemiluminescence using Siemens Vista analyzer     Prostate Specific Antigen Screen   Date Value Ref Range Status   12/05/2022 0.71 0.00 - 3.50 ng/mL Final     Reviewed orders with patient. Reviewed health maintenance and updated orders accordingly - Yes    Reviewed and updated as needed this visit by clinical staff   Tobacco  Allergies  Meds  Problems  Med Hx  Surg Hx  Fam Hx          Reviewed and updated as needed this visit by Provider                  Patient Active Problem List   Diagnosis    Hyperlipidemia LDL goal <130    Hypertension goal BP (blood pressure) < 140/90    Major depressive disorder, single episode, mild (H24)    Anxiety    Family history of coronary artery disease    Tubular adenoma of colon - 7/2021 - repeat colonoscopy 5 years    History of colonic polyps       Past Medical History:   Diagnosis Date    Anxiety 01/01/2014    History of colonic polyps 07/2021    Hyperlipidemia LDL goal  <130 2001    Hypertension goal BP (blood pressure) < 140/90 2001    Major depressive disorder, single episode, mild (H24) 2014    Mild intermittent asthma childhood    resolved    Tubular adenoma of colon - 2021 - repeat colonoscopy 5 years 8/10/2021       Past Surgical History:   Procedure Laterality Date    COLONOSCOPY  2021    polyps - due 5 yrs    STRESS ECHO ([facility])  , 10/15    normal except increased bp    SURGICAL HISTORY OF -       jaw realignment - misbite    ZZC LASIK (EMP) W OPT MYOPIA P1         Current Outpatient Medications   Medication Sig Dispense Refill    ADVIL 200 MG OR TABS 1 TABLET EVERY 4 TO 6 HOURS AS NEEDED 120 0    ASPIRIN 81 MG OR TABS ONE DAILY 100 3    atorvastatin (LIPITOR) 80 MG tablet Take 1 tablet (80 mg) by mouth daily 90 tablet 3    EXCEDRIN 250-250-65 MG OR TABS 2 TABLETS EVERY 6 HOURS AS NEEDED 80 0    HEMP OIL OR EXTRACT OR OTHER CBD CANNABINOID, NOT MEDICAL CANNABIS, PRN at Bedtime      hydrocortisone (WESTCORT) 0.2 % external cream Apply sparingly to affected area three times daily for 14 days. 15 g 3    lisinopril-hydrochlorothiazide (ZESTORETIC) 20-12.5 MG tablet Take 1 tablet by mouth daily 90 tablet 3    sertraline (ZOLOFT) 50 MG tablet Take 1 tablet (50 mg) by mouth daily 90 tablet 3       No Known Allergies    Family History   Problem Relation Age of Onset    Hypertension Mother     Lipids Mother     C.A.D. Mother     Atrial fibrillation Mother     Hypertension Father          at age 63    Lipids Father         triglycerides    C.A.D. Father     C.A.D. Maternal Grandmother     Lung Cancer Maternal Grandfather         smoker    Chronic Obstructive Pulmonary Disease Paternal Grandmother         smoker       Social History     Socioeconomic History    Marital status:      Spouse name: Nicolasa    Number of children: 0    Years of education: 16    Highest education level: None   Occupational History     Employer: [employer]   Tobacco Use    Smoking status: Never    Smokeless tobacco: Never   Vaping Use    Vaping Use: Never used   Substance and Sexual Activity    Alcohol use: Yes     Alcohol/week: 4.0 - 6.0 standard drinks of alcohol     Types: 4 - 6 Standard drinks or equivalent per week     Comment: 4-6 per week avg    Drug use: No    Sexual activity: Yes     Partners: Female   Other Topics Concern    Caffeine Concern Yes     Comment: 2 cups qd    Exercise Yes     Comment: 3-4 times per week, walks 6-7 times per week    Seat Belt Yes    Parent/sibling w/ CABG, MI or angioplasty before 65F 55M? Yes     Social Determinants of Health     Financial Resource Strain: Low Risk  (1/20/2024)    Financial Resource Strain     Within the past 12 months, have you or your family members you live with been unable to get utilities (heat, electricity) when it was really needed?: No   Food Insecurity: Low Risk  (1/20/2024)    Food Insecurity     Within the past 12 months, did you worry that your food would run out before you got money to buy more?: No     Within the past 12 months, did the food you bought just not last and you didn t have money to get more?: No   Transportation Needs: Low Risk  (1/20/2024)    Transportation Needs     Within the past 12 months, has lack of transportation kept you from medical appointments, getting your medicines, non-medical meetings or appointments, work, or from getting things that you need?: No   Interpersonal Safety: Low Risk  (1/25/2024)    Interpersonal Safety     Do you feel physically and emotionally safe where you currently live?: Yes     Within the past 12 months, have you been hit, slapped, kicked or otherwise physically hurt by someone?: No     Within the past 12 months, have you been humiliated or emotionally abused in other ways by your partner or ex-partner?: No   Housing Stability: Low Risk  (1/20/2024)    Housing Stability     Do you have housing? : Yes     Are you worried about losing your  "housing?: No     Review of Systems   Constitutional:  Negative for chills and fever.   HENT:  Negative for congestion, ear pain, hearing loss and sore throat.    Eyes:  Negative for pain and visual disturbance.   Respiratory:  Negative for cough and shortness of breath.    Cardiovascular:  Negative for chest pain and palpitations.   Gastrointestinal:  Negative for abdominal pain, constipation, diarrhea and nausea.   Genitourinary:  Negative for dysuria, frequency, genital sores, hematuria, penile discharge and urgency.   Musculoskeletal:  Negative for arthralgias, joint swelling and myalgias.   Skin:  Negative for rash.   Neurological:  Negative for dizziness, weakness and headaches.   Psychiatric/Behavioral:  The patient is not nervous/anxious.      OBJECTIVE:   /78   Pulse 93   Temp 97.5  F (36.4  C) (Tympanic)   Resp 16   Ht 1.772 m (5' 9.75\")   Wt 90.3 kg (199 lb)   SpO2 96%   BMI 28.76 kg/m     Estimated body mass index is 28.76 kg/m  as calculated from the following:    Height as of this encounter: 1.772 m (5' 9.75\").    Weight as of this encounter: 90.3 kg (199 lb).    Physical Exam  GENERAL: alert and no distress  EYES: Eyes grossly normal to inspection, PERRL and conjunctivae and sclerae normal  HENT: ear canals and TM's normal, nose and mouth without ulcers or lesions  NECK: no adenopathy, no asymmetry, masses, or scars  RESP: lungs clear to auscultation - no rales, rhonchi or wheezes  CV: regular rate and rhythm, normal S1 S2, no S3 or S4, no murmur, click or rub, no peripheral edema  ABDOMEN: soft, nontender, no hepatosplenomegaly, no masses and bowel sounds normal  MS: no gross musculoskeletal defects noted, no edema  SKIN: no suspicious lesions or rashes  NEURO: Normal strength and tone, mentation intact and speech normal  PSYCH: mentation appears normal, affect normal/bright  LYMPH: no cervical, supraclavicular, axillary, or inguinal adenopathy   / Rectal :  pt declines    Diagnostic " Test Results:  Labs reviewed in Epic    Pending    ASSESSMENT/PLAN:   Routine general medical examination at a health care facility    - Comprehensive metabolic panel  - Lipid panel reflex to direct LDL Fasting  - CBC with platelets  - CK total  - UA Macroscopic with reflex to Microscopic and Culture  - Albumin Random Urine Quantitative with Creat Ratio  - TSH with free T4 reflex  - Prostate Specific Antigen Screen  - Fecal colorectal cancer screen FIT  - REVIEW OF HEALTH MAINTENANCE PROTOCOL ORDERS  - PRIMARY CARE FOLLOW-UP SCHEDULING  - Fecal colorectal cancer screen FIT  - Comprehensive metabolic panel  - Lipid panel reflex to direct LDL Fasting  - CBC with platelets  - CK total  - UA Macroscopic with reflex to Microscopic and Culture  - Albumin Random Urine Quantitative with Creat Ratio  - TSH with free T4 reflex  - Prostate Specific Antigen Screen  - UA Microscopic with Reflex to Culture    Hypertension goal BP (blood pressure) < 140/90    - Comprehensive metabolic panel  - UA Macroscopic with reflex to Microscopic and Culture  - Albumin Random Urine Quantitative with Creat Ratio  - REVIEW OF HEALTH MAINTENANCE PROTOCOL ORDERS  - PRIMARY CARE FOLLOW-UP SCHEDULING  - Comprehensive metabolic panel  - UA Macroscopic with reflex to Microscopic and Culture  - Albumin Random Urine Quantitative with Creat Ratio  - UA Microscopic with Reflex to Culture  - lisinopril-hydrochlorothiazide (ZESTORETIC) 20-12.5 MG tablet  Dispense: 90 tablet; Refill: 3    Hyperlipidemia LDL goal <130    - Comprehensive metabolic panel  - Lipid panel reflex to direct LDL Fasting  - CK total  - REVIEW OF HEALTH MAINTENANCE PROTOCOL ORDERS  - PRIMARY CARE FOLLOW-UP SCHEDULING  - Comprehensive metabolic panel  - Lipid panel reflex to direct LDL Fasting  - CK total  - atorvastatin (LIPITOR) 80 MG tablet  Dispense: 90 tablet; Refill: 3    Major depressive disorder, single episode, mild (H24)    - TSH with free T4 reflex  - REVIEW OF HEALTH  MAINTENANCE PROTOCOL ORDERS  - PRIMARY CARE FOLLOW-UP SCHEDULING  - TSH with free T4 reflex  - sertraline (ZOLOFT) 50 MG tablet  Dispense: 90 tablet; Refill: 3    Anxiety    - TSH with free T4 reflex  - REVIEW OF HEALTH MAINTENANCE PROTOCOL ORDERS  - PRIMARY CARE FOLLOW-UP SCHEDULING  - TSH with free T4 reflex  - sertraline (ZOLOFT) 50 MG tablet  Dispense: 90 tablet; Refill: 3    Rash    - REVIEW OF HEALTH MAINTENANCE PROTOCOL ORDERS  - PRIMARY CARE FOLLOW-UP SCHEDULING  - hydrocortisone (WESTCORT) 0.2 % external cream  Dispense: 15 g; Refill: 3    Family history of coronary artery disease    - Lipid panel reflex to direct LDL Fasting  - REVIEW OF HEALTH MAINTENANCE PROTOCOL ORDERS  - PRIMARY CARE FOLLOW-UP SCHEDULING  - Lipid panel reflex to direct LDL Fasting    History of colonic polyps    - Fecal colorectal cancer screen FIT  - REVIEW OF HEALTH MAINTENANCE PROTOCOL ORDERS  - PRIMARY CARE FOLLOW-UP SCHEDULING  - Fecal colorectal cancer screen FIT    Tubular adenoma of colon - 7/2021 - repeat colonoscopy 5 years    - Fecal colorectal cancer screen FIT  - REVIEW OF HEALTH MAINTENANCE PROTOCOL ORDERS  - PRIMARY CARE FOLLOW-UP SCHEDULING  - Fecal colorectal cancer screen FIT    Screen for colon cancer    - Fecal colorectal cancer screen FIT  - REVIEW OF HEALTH MAINTENANCE PROTOCOL ORDERS  - PRIMARY CARE FOLLOW-UP SCHEDULING  - Fecal colorectal cancer screen FIT    Screening for prostate cancer    - Prostate Specific Antigen Screen  - REVIEW OF HEALTH MAINTENANCE PROTOCOL ORDERS  - PRIMARY CARE FOLLOW-UP SCHEDULING  - Prostate Specific Antigen Screen    Medication monitoring encounter    - Comprehensive metabolic panel  - Lipid panel reflex to direct LDL Fasting  - CBC with platelets  - CK total  - UA Macroscopic with reflex to Microscopic and Culture  - Albumin Random Urine Quantitative with Creat Ratio  - TSH with free T4 reflex  - Fecal colorectal cancer screen FIT  - REVIEW OF HEALTH MAINTENANCE PROTOCOL ORDERS  -  PRIMARY CARE FOLLOW-UP SCHEDULING  - Fecal colorectal cancer screen FIT  - Comprehensive metabolic panel  - Lipid panel reflex to direct LDL Fasting  - CBC with platelets  - CK total  - UA Macroscopic with reflex to Microscopic and Culture  - Albumin Random Urine Quantitative with Creat Ratio  - TSH with free T4 reflex  - UA Microscopic with Reflex to Culture    Patient has been advised of split billing requirements and indicates understanding: Yes    Counseling  Reviewed preventive health counseling, as reflected in patient instructions  Special attention given to:        Regular exercise       Healthy diet/nutrition       Vision screening       Hearing screening       Immunizations  Consider CDC travel - Travels to [place] - consider prevnar 20       Colorectal cancer screening       Prostate cancer screening       Osteoporosis prevention/bone health    Colonoscopy 7/2026  Med refills  Labs pending    He reports that he has never smoked. He has never used smokeless tobacco.            Signed Electronically by:            Anthony Gutiérrez MD, FAAFP     [facility]  [address] [place] [id number]  [email or web address]   Office: [phone]  Fax: [phone]

## 2024-01-25 NOTE — LETTER
January 29, 2024      Obdulio Wallace  [address] [facility] [id number]        Dear ,    We are writing to inform you of your test results.    Labs are overall quite good, except     Minimally elevated glucose, teetering on prediabetes     We advise:     Continue current cares.   Balanced low cholesterol diet.   Regular exercise.     Recheck fasting glucose in 3-4 months     For additional lab test information, [email or web address] is an excellent reference.     Resulted Orders   Comprehensive metabolic panel   Result Value Ref Range    Sodium 138 135 - 145 mmol/L      Comment:      Reference intervals for this test were updated on 09/26/2023 to more accurately reflect our healthy population. There may be differences in the flagging of prior results with similar values performed with this method. Interpretation of those prior results can be made in the context of the updated reference intervals.     Potassium 4.2 3.4 - 5.3 mmol/L    Carbon Dioxide (CO2) 26 22 - 29 mmol/L    Anion Gap 11 7 - 15 mmol/L    Urea Nitrogen 15.0 6.0 - 20.0 mg/dL    Creatinine 0.85 0.67 - 1.17 mg/dL    GFR Estimate >90 >60 mL/min/1.73m2    Calcium 9.8 8.6 - 10.0 mg/dL    Chloride 101 98 - 107 mmol/L    Glucose 103 (H) 70 - 99 mg/dL    Alkaline Phosphatase 104 40 - 150 U/L      Comment:      Reference intervals for this test were updated on 11/14/2023 to more accurately reflect our healthy population. There may be differences in the flagging of prior results with similar values performed with this method. Interpretation of those prior results can be made in the context of the updated reference intervals.    AST 37 0 - 45 U/L      Comment:      Reference intervals for this test were updated on 6/12/2023 to more accurately reflect our healthy population. There may be differences in the flagging of prior results with similar values performed with this method. Interpretation of those prior results can be made in the context of  the updated reference intervals.    ALT 43 0 - 70 U/L      Comment:      Reference intervals for this test were updated on 6/12/2023 to more accurately reflect our healthy population. There may be differences in the flagging of prior results with similar values performed with this method. Interpretation of those prior results can be made in the context of the updated reference intervals.      Protein Total 7.7 6.4 - 8.3 g/dL    Albumin 4.9 3.5 - 5.2 g/dL    Bilirubin Total 0.5 <=1.2 mg/dL   Lipid panel reflex to direct LDL Fasting   Result Value Ref Range    Cholesterol 145 <200 mg/dL    Triglycerides 128 <150 mg/dL    Direct Measure HDL 40 >=40 mg/dL    LDL Cholesterol Calculated 79 <=100 mg/dL    Non HDL Cholesterol 105 <130 mg/dL    Patient Fasting > 8hrs? Yes     Narrative    Cholesterol  Desirable:  <200 mg/dL    Triglycerides  Normal:  Less than 150 mg/dL  Borderline High:  150-199 mg/dL  High:  200-499 mg/dL  Very High:  Greater than or equal to 500 mg/dL    Direct Measure HDL  Female:  Greater than or equal to 50 mg/dL   Male:  Greater than or equal to 40 mg/dL    LDL Cholesterol  Desirable:  <100mg/dL  Above Desirable:  100-129 mg/dL   Borderline High:  130-159 mg/dL   High:  160-189 mg/dL   Very High:  >= 190 mg/dL    Non HDL Cholesterol  Desirable:  130 mg/dL  Above Desirable:  130-159 mg/dL  Borderline High:  160-189 mg/dL  High:  190-219 mg/dL  Very High:  Greater than or equal to 220 mg/dL   CBC with platelets   Result Value Ref Range    WBC Count 6.1 4.0 - 11.0 10e3/uL    RBC Count 5.00 4.40 - 5.90 10e6/uL    Hemoglobin 15.5 13.3 - 17.7 g/dL    Hematocrit 44.7 40.0 - 53.0 %    MCV 89 78 - 100 fL    MCH 31.0 26.5 - 33.0 pg    MCHC 34.7 31.5 - 36.5 g/dL    RDW 12.3 10.0 - 15.0 %    Platelet Count 275 150 - 450 10e3/uL   CK total   Result Value Ref Range     39 - 308 U/L   UA Macroscopic with reflex to Microscopic and Culture   Result Value Ref Range    Color Urine Yellow Colorless, Straw, Light  Yellow, Yellow    Appearance Urine Clear Clear    Glucose Urine Negative Negative mg/dL    Bilirubin Urine Negative Negative    Ketones Urine Negative Negative mg/dL    Specific Gravity Urine 1.020 1.003 - 1.035    Blood Urine Negative Negative    pH Urine 7.5 (H) 5.0 - 7.0    Protein Albumin Urine Trace (A) Negative mg/dL    Urobilinogen Urine 0.2 0.2, 1.0 E.U./dL    Nitrite Urine Negative Negative    Leukocyte Esterase Urine Negative Negative   Albumin Random Urine Quantitative with Creat Ratio   Result Value Ref Range    Creatinine Urine mg/dL 182.0 mg/dL      Comment:      The reference ranges have not been established in urine creatinine. The results should be integrated into the clinical context for interpretation.    Albumin Urine mg/L <12.0 mg/L      Comment:      The reference ranges have not been established in urine albumin. The results should be integrated into the clinical context for interpretation.    Albumin Urine mg/g Cr        Comment:      Unable to calculate, urine albumin and/or urine creatinine is outside detectable limits.  Microalbuminuria is defined as an albumin:creatinine ratio of 17 to 299 for males and 25 to 299 for females. A ratio of albumin:creatinine of 300 or higher is indicative of overt proteinuria.  Due to biologic variability, positive results should be confirmed by a second, first-morning random or 24-hour timed urine specimen. If there is discrepancy, a third specimen is recommended. When 2 out of 3 results are in the microalbuminuria range, this is evidence for incipient nephropathy and warrants increased efforts at glucose control, blood pressure control, and institution of therapy with an angiotensin-converting-enzyme (ACE) inhibitor (if the patient can tolerate it).     TSH with free T4 reflex   Result Value Ref Range    TSH 1.83 0.30 - 4.20 uIU/mL   Prostate Specific Antigen Screen   Result Value Ref Range    Prostate Specific Antigen Screen 0.97 0.00 - 3.50 ng/mL     Narrative    This result is obtained using the Roche Elecsys total PSA method on the yolanda [id number] immunoassay analyzer. Results obtained with different assay methods or kits cannot be used interchangeably.   UA Microscopic with Reflex to Culture   Result Value Ref Range    Bacteria Urine Moderate (A) None Seen /HPF    RBC Urine 0-2 0-2 /HPF /HPF    WBC Urine 0-5 0-5 /HPF /HPF    Squamous Epithelials Urine Few (A) None Seen /LPF    Narrative    Urine Culture not indicated       If you have any questions or concerns, please call the clinic at the number listed above.       Sincerely,            Anthony Gutiérrez M.D.

## 2024-01-26 LAB
CREAT UR-MCNC: 182 MG/DL
MICROALBUMIN UR-MCNC: <12 MG/L
MICROALBUMIN/CREAT UR: NORMAL MG/G{CREAT}

## 2024-01-28 DIAGNOSIS — R73.09 ABNORMAL GLUCOSE: Primary | ICD-10-CM

## 2024-03-03 NOTE — TELEPHONE ENCOUNTER
Left non-detailed message for patient to call back.  (see message below)    Natalya Greene         the EKG is unchanged from prior EKG

## 2025-02-06 ENCOUNTER — OFFICE VISIT (OUTPATIENT)
Dept: FAMILY MEDICINE | Facility: CLINIC | Age: 55
End: 2025-02-06

## 2025-02-06 VITALS
DIASTOLIC BLOOD PRESSURE: 72 MMHG | WEIGHT: 197 LBS | TEMPERATURE: 96.3 F | BODY MASS INDEX: 28.2 KG/M2 | OXYGEN SATURATION: 98 % | HEART RATE: 83 BPM | HEIGHT: 70 IN | SYSTOLIC BLOOD PRESSURE: 116 MMHG | RESPIRATION RATE: 16 BRPM

## 2025-02-06 DIAGNOSIS — F32.0 MAJOR DEPRESSIVE DISORDER, SINGLE EPISODE, MILD: ICD-10-CM

## 2025-02-06 DIAGNOSIS — D12.6 TUBULAR ADENOMA OF COLON: ICD-10-CM

## 2025-02-06 DIAGNOSIS — F41.9 ANXIETY: ICD-10-CM

## 2025-02-06 DIAGNOSIS — Z82.49 FAMILY HISTORY OF CORONARY ARTERY DISEASE: ICD-10-CM

## 2025-02-06 DIAGNOSIS — Z23 NEED FOR PNEUMOCOCCAL VACCINATION: ICD-10-CM

## 2025-02-06 DIAGNOSIS — E78.5 HYPERLIPIDEMIA LDL GOAL <130: ICD-10-CM

## 2025-02-06 DIAGNOSIS — Z12.11 SCREEN FOR COLON CANCER: ICD-10-CM

## 2025-02-06 DIAGNOSIS — Z86.0100 HISTORY OF COLONIC POLYPS: ICD-10-CM

## 2025-02-06 DIAGNOSIS — Z12.5 SCREENING FOR PROSTATE CANCER: ICD-10-CM

## 2025-02-06 DIAGNOSIS — Z51.81 MEDICATION MONITORING ENCOUNTER: ICD-10-CM

## 2025-02-06 DIAGNOSIS — I10 HYPERTENSION GOAL BP (BLOOD PRESSURE) < 140/90: ICD-10-CM

## 2025-02-06 DIAGNOSIS — Z00.00 ROUTINE GENERAL MEDICAL EXAMINATION AT A HEALTH CARE FACILITY: Primary | ICD-10-CM

## 2025-02-06 LAB
ALBUMIN SERPL BCG-MCNC: 4.6 G/DL (ref 3.5–5.2)
ALBUMIN UR-MCNC: NEGATIVE MG/DL
ALP SERPL-CCNC: 118 U/L (ref 40–150)
ALT SERPL W P-5'-P-CCNC: 45 U/L (ref 0–70)
ANION GAP SERPL CALCULATED.3IONS-SCNC: 11 MMOL/L (ref 7–15)
APPEARANCE UR: CLEAR
AST SERPL W P-5'-P-CCNC: 31 U/L (ref 0–45)
BILIRUB SERPL-MCNC: 0.5 MG/DL
BILIRUB UR QL STRIP: NEGATIVE
BUN SERPL-MCNC: 18.5 MG/DL (ref 6–20)
CALCIUM SERPL-MCNC: 9.7 MG/DL (ref 8.8–10.4)
CHLORIDE SERPL-SCNC: 103 MMOL/L (ref 98–107)
CHOLEST SERPL-MCNC: 134 MG/DL
CK SERPL-CCNC: 105 U/L (ref 39–308)
COLOR UR AUTO: YELLOW
CREAT SERPL-MCNC: 0.84 MG/DL (ref 0.67–1.17)
CREAT UR-MCNC: 167 MG/DL
EGFRCR SERPLBLD CKD-EPI 2021: >90 ML/MIN/1.73M2
ERYTHROCYTE [DISTWIDTH] IN BLOOD BY AUTOMATED COUNT: 12.8 % (ref 10–15)
FASTING STATUS PATIENT QL REPORTED: YES
FASTING STATUS PATIENT QL REPORTED: YES
GLUCOSE SERPL-MCNC: 105 MG/DL (ref 70–99)
GLUCOSE UR STRIP-MCNC: NEGATIVE MG/DL
HCO3 SERPL-SCNC: 27 MMOL/L (ref 22–29)
HCT VFR BLD AUTO: 44.5 % (ref 40–53)
HDLC SERPL-MCNC: 41 MG/DL
HGB BLD-MCNC: 15.3 G/DL (ref 13.3–17.7)
HGB UR QL STRIP: NEGATIVE
KETONES UR STRIP-MCNC: NEGATIVE MG/DL
LDLC SERPL CALC-MCNC: 71 MG/DL
LEUKOCYTE ESTERASE UR QL STRIP: NEGATIVE
MCH RBC QN AUTO: 30.8 PG (ref 26.5–33)
MCHC RBC AUTO-ENTMCNC: 34.4 G/DL (ref 31.5–36.5)
MCV RBC AUTO: 90 FL (ref 78–100)
MICROALBUMIN UR-MCNC: <12 MG/L
MICROALBUMIN/CREAT UR: NORMAL MG/G{CREAT}
NITRATE UR QL: NEGATIVE
NONHDLC SERPL-MCNC: 93 MG/DL
PH UR STRIP: 6 [PH] (ref 5–7)
PLATELET # BLD AUTO: 299 10E3/UL (ref 150–450)
POTASSIUM SERPL-SCNC: 4.9 MMOL/L (ref 3.4–5.3)
PROT SERPL-MCNC: 7.6 G/DL (ref 6.4–8.3)
PSA SERPL DL<=0.01 NG/ML-MCNC: 0.86 NG/ML (ref 0–3.5)
RBC # BLD AUTO: 4.96 10E6/UL (ref 4.4–5.9)
SODIUM SERPL-SCNC: 141 MMOL/L (ref 135–145)
SP GR UR STRIP: 1.02 (ref 1–1.03)
TRIGL SERPL-MCNC: 112 MG/DL
TSH SERPL DL<=0.005 MIU/L-ACNC: 1.19 UIU/ML (ref 0.3–4.2)
UROBILINOGEN UR STRIP-ACNC: 0.2 E.U./DL
WBC # BLD AUTO: 5.9 10E3/UL (ref 4–11)

## 2025-02-06 RX ORDER — LISINOPRIL AND HYDROCHLOROTHIAZIDE 12.5; 2 MG/1; MG/1
1 TABLET ORAL DAILY
Qty: 90 TABLET | Refills: 3 | Status: SHIPPED | OUTPATIENT
Start: 2025-02-06

## 2025-02-06 RX ORDER — ATORVASTATIN CALCIUM 80 MG/1
80 TABLET, FILM COATED ORAL DAILY
Qty: 90 TABLET | Refills: 3 | Status: SHIPPED | OUTPATIENT
Start: 2025-02-06

## 2025-02-06 SDOH — HEALTH STABILITY: PHYSICAL HEALTH: ON AVERAGE, HOW MANY DAYS PER WEEK DO YOU ENGAGE IN MODERATE TO STRENUOUS EXERCISE (LIKE A BRISK WALK)?: 4 DAYS

## 2025-02-06 ASSESSMENT — SOCIAL DETERMINANTS OF HEALTH (SDOH): HOW OFTEN DO YOU GET TOGETHER WITH FRIENDS OR RELATIVES?: ONCE A WEEK

## 2025-02-06 ASSESSMENT — PATIENT HEALTH QUESTIONNAIRE - PHQ9
SUM OF ALL RESPONSES TO PHQ QUESTIONS 1-9: 0
10. IF YOU CHECKED OFF ANY PROBLEMS, HOW DIFFICULT HAVE THESE PROBLEMS MADE IT FOR YOU TO DO YOUR WORK, TAKE CARE OF THINGS AT HOME, OR GET ALONG WITH OTHER PEOPLE: NOT DIFFICULT AT ALL
SUM OF ALL RESPONSES TO PHQ QUESTIONS 1-9: 0

## 2025-02-06 NOTE — PROGRESS NOTES
Preventive Care Visit  [facility] [place]  Anthony Gutiérrez MD, Family Medicine  Feb 6, 2025      Assessment & Plan     Routine general medical examination at a health care facility    - Comprehensive metabolic panel  - Lipid panel reflex to direct LDL Fasting  - CBC with platelets  - CK total  - UA Macroscopic with reflex to Microscopic and Culture  - Albumin Random Urine Quantitative with Creat Ratio  - TSH with free T4 reflex  - Prostate Specific Antigen Screen  - Fecal colorectal cancer screen FIT  - REVIEW OF HEALTH MAINTENANCE PROTOCOL ORDERS  - PRIMARY CARE FOLLOW-UP SCHEDULING    Hypertension goal BP (blood pressure) < 140/90    - Comprehensive metabolic panel  - UA Macroscopic with reflex to Microscopic and Culture  - Albumin Random Urine Quantitative with Creat Ratio  - REVIEW OF HEALTH MAINTENANCE PROTOCOL ORDERS  - lisinopril-hydrochlorothiazide (ZESTORETIC) 20-12.5 MG tablet  Dispense: 90 tablet; Refill: 3  - PRIMARY CARE FOLLOW-UP SCHEDULING    Hyperlipidemia LDL goal <130    - Comprehensive metabolic panel  - Lipid panel reflex to direct LDL Fasting  - CK total  - REVIEW OF HEALTH MAINTENANCE PROTOCOL ORDERS  - atorvastatin (LIPITOR) 80 MG tablet  Dispense: 90 tablet; Refill: 3  - PRIMARY CARE FOLLOW-UP SCHEDULING    Major depressive disorder, single episode, mild    - TSH with free T4 reflex  - REVIEW OF HEALTH MAINTENANCE PROTOCOL ORDERS  - sertraline (ZOLOFT) 50 MG tablet  Dispense: 90 tablet; Refill: 3  - PRIMARY CARE FOLLOW-UP SCHEDULING    Anxiety    - TSH with free T4 reflex  - REVIEW OF HEALTH MAINTENANCE PROTOCOL ORDERS  - sertraline (ZOLOFT) 50 MG tablet  Dispense: 90 tablet; Refill: 3  - PRIMARY CARE FOLLOW-UP SCHEDULING    Family history of coronary artery disease    - Lipid panel reflex to direct LDL Fasting  - REVIEW OF HEALTH MAINTENANCE PROTOCOL ORDERS  - PRIMARY CARE FOLLOW-UP SCHEDULING    History of colonic polyps    - Fecal colorectal cancer screen FIT  - REVIEW OF  "HEALTH MAINTENANCE PROTOCOL ORDERS  - PRIMARY CARE FOLLOW-UP SCHEDULING    Tubular adenoma of colon - 7/2021 - repeat colonoscopy 5 years    - Fecal colorectal cancer screen FIT  - REVIEW OF HEALTH MAINTENANCE PROTOCOL ORDERS  - PRIMARY CARE FOLLOW-UP SCHEDULING    Screening for prostate cancer    - Prostate Specific Antigen Screen  - REVIEW OF HEALTH MAINTENANCE PROTOCOL ORDERS  - PRIMARY CARE FOLLOW-UP SCHEDULING    Screen for colon cancer    - Fecal colorectal cancer screen FIT  - REVIEW OF HEALTH MAINTENANCE PROTOCOL ORDERS  - PRIMARY CARE FOLLOW-UP SCHEDULING    Medication monitoring encounter    - Comprehensive metabolic panel  - Lipid panel reflex to direct LDL Fasting  - CBC with platelets  - CK total  - UA Macroscopic with reflex to Microscopic and Culture  - Albumin Random Urine Quantitative with Creat Ratio  - TSH with free T4 reflex  - Prostate Specific Antigen Screen  - Fecal colorectal cancer screen FIT  - REVIEW OF HEALTH MAINTENANCE PROTOCOL ORDERS  - PRIMARY CARE FOLLOW-UP SCHEDULING    Need for pneumococcal vaccination    - Pneumococcal 20 Valent Conjugate (PCV20)    Patient has been advised of split billing requirements and indicates understanding: Yes    BMI  Estimated body mass index is 28.47 kg/m  as calculated from the following:    Height as of this encounter: 1.772 m (5' 9.75\").    Weight as of this encounter: 89.4 kg (197 lb).       Counseling  Appropriate preventive services were addressed with this patient via screening, questionnaire, or discussion as appropriate for fall prevention, nutrition, physical activity, Tobacco-use cessation, social engagement, weight loss and cognition.  Checklist reviewing preventive services available has been given to the patient.  Reviewed patient's diet, addressing concerns and/or questions.     Regular exercise    Plan:    1) Medications: reviewed, refilled    2) Labs: pending    3) Immunizations: prevnar 20    4) Imaging/Diagnostics: NA    5) Consults: " NA    Return in about 1 year (around 2/6/2026) for Complete Physical, Medication Recheck Visit, Follow Up Chronic.    24 minutes spent by myself on the date of the encounter doing chart review, history and exam, documentation and further activities per the note.    The longitudinal plan of care for the diagnosis(es)/condition(s) as documented were addressed during this visit. Due to the added complexity in care, I will continue to support Pat in the subsequent management and with ongoing continuity of care.    Nica Mak is a 55 year old, presenting for the following:  Physical        2/6/2025    10:02 AM   Additional Questions   Roomed by Mi DUPREE MA      Hyperlipidemia / FH of CAD Follow-Up    Are you regularly taking any medication or supplement to lower your cholesterol?   Yes-    Are you having muscle aches or other side effects that you think could be caused by your cholesterol lowering medication?  No    Recent Labs   Lab Test 01/25/24  1002 12/05/22  1025   CHOL 145 195   HDL 40 42   LDL 79 133*   TRIG 128 99     Hypertension Follow-up    Do you check your blood pressure regularly outside of the clinic? No   Are you following a low salt diet? No  Are your blood pressures ever more than 140 on the top number (systolic) OR more   than 90 on the bottom number (diastolic), for example 140/90? N/A    BP Readings from Last 3 Encounters:   02/06/25 116/72   01/25/24 122/78   12/05/22 120/74     Creatinine   Date Value Ref Range Status   01/25/2024 0.85 0.67 - 1.17 mg/dL Final   06/04/2021 0.84 0.66 - 1.25 mg/dL Final     GFR Estimate   Date Value Ref Range Status   01/25/2024 >90 >60 mL/min/1.73m2 Final   06/04/2021 >90 >60 mL/min/[1.73_m2] Final     Comment:     Non  GFR Calc  Starting 12/18/2018, serum creatinine based estimated GFR (eGFR) will be   calculated using the Chronic Kidney Disease Epidemiology Collaboration   (CKD-EPI) equation.       Depression / Anxiety        12/5/2022    10:18  AM 1/25/2024     9:53 AM 2/6/2025     8:59 AM   PHQ   PHQ-9 Total Score 0 0 0    Q9: Thoughts of better off dead/self-harm past 2 weeks Not at all Not at all Not at all       Patient-reported           6/20/2022    10:09 PM 12/5/2022    10:18 AM 1/25/2024     9:54 AM   ABI-7 SCORE   Total Score 1 (minimal anxiety)  0 (minimal anxiety)   Total Score 1 1 0     Health Care Directive  Patient does not have a Health Care Directive: Discussed advance care planning with patient; information given to patient to review.      2/6/2025   General Health   How would you rate your overall physical health? Good   Feel stress (tense, anxious, or unable to sleep) Only a little   (!) STRESS CONCERN      2/6/2025   Nutrition   Three or more servings of calcium each day? Yes   Diet: Regular (no restrictions)   How many servings of fruit and vegetables per day? (!) 2-3   How many sweetened beverages each day? 0-1         2/6/2025   Exercise   Days per week of moderate/strenous exercise 4 days         2/6/2025   Social Factors   Frequency of gathering with friends or relatives Once a week   Worry food won't last until get money to buy more No   Food not last or not have enough money for food? No   Do you have housing? (Housing is defined as stable permanent housing and does not include staying ouside in a car, in a tent, in an abandoned building, in an overnight shelter, or couch-surfing.) Yes   Are you worried about losing your housing? No   Lack of transportation? No   Unable to get utilities (heat,electricity)? No         2/6/2025   Fall Risk   Fallen 2 or more times in the past year? No   Trouble with walking or balance? No          2/6/2025   Dental   Dentist two times every year? Yes          Today's PHQ-9 Score:       2/6/2025     8:59 AM   PHQ-9 SCORE   PHQ-9 Total Score MyChart 0   PHQ-9 Total Score 0        Patient-reported         2/6/2025   Substance Use   Alcohol more than 3/day or more than 7/wk No   Do you use any other  substances recreationally? (!) CANNABIS PRODUCTS     Social History     Tobacco Use    Smoking status: Never     Passive exposure: Never    Smokeless tobacco: Never   Vaping Use    Vaping status: Never Used   Substance Use Topics    Alcohol use: Not Currently     Alcohol/week: 0.0 - 1.0 standard drinks of alcohol    Drug use: No     Comment: CBD at HS           2025   STI Screening   New sexual partner(s) since last STI/HIV test? No   Last PSA:   PSA   Date Value Ref Range Status   2021 0.70 0 - 4 ug/L Final     Comment:     Assay Method:  Chemiluminescence using Siemens Vista analyzer     Prostate Specific Antigen Screen   Date Value Ref Range Status   2024 0.97 0.00 - 3.50 ng/mL Final     ASCVD Risk   The 10-year ASCVD risk score (Francheska MÉNDEZ, et al., 2019) is: 4.6%    Values used to calculate the score:      Age: 55 years      Sex: Male      Is Non- : No      Diabetic: No      Tobacco smoker: No      Systolic Blood Pressure: 116 mmHg      Is BP treated: Yes      HDL Cholesterol: 40 mg/dL      Total Cholesterol: 145 mg/dL    Fracture Risk Assessment Tool  Link to Frax Calculator  Use the information below to complete the Frax calculator  : 1970  Sex: male  Weight (kg): 89.4 kg (actual weight)  Height (cm): 177.2 cm  Previous Fragility Fracture:  No  History of parent with fractured hip:  No  Current Smoking:  No  Patient has been on glucocorticoids for more than 3 months (5mg/day or more): No  Rheumatoid Arthritis on Problem List:  No  Secondary Osteoporosis on Problem List:  No  Consumes 3 or more units of alcohol per day: No  Femoral Neck BMD (g/cm2)           Reviewed and updated as needed this visit by Provider                  Patient Active Problem List   Diagnosis    Hyperlipidemia LDL goal <130    Hypertension goal BP (blood pressure) < 140/90    Major depressive disorder, single episode, mild    Anxiety    Family history of coronary artery disease     Tubular adenoma of colon - 2021 - repeat colonoscopy 5 years    History of colonic polyps       Past Medical History:   Diagnosis Date    Anxiety 2014    History of colonic polyps 2021    Hyperlipidemia LDL goal <130 2001    Hypertension goal BP (blood pressure) < 140/90 2001    Major depressive disorder, single episode, mild 2014    Mild intermittent asthma childhood    resolved    Tubular adenoma of colon - 2021 - repeat colonoscopy 5 years 8/10/2021       Past Surgical History:   Procedure Laterality Date    COLONOSCOPY  2021    polyps - due 5 yrs    STRESS ECHO ([facility])  , 10/15    normal except increased bp    SURGICAL HISTORY OF -       jaw realignment - misbite    ZZC LASIK (EMP) W OPT MYOPIA P1         Current Outpatient Medications   Medication Sig Dispense Refill    ADVIL 200 MG OR TABS 1 TABLET EVERY 4 TO 6 HOURS AS NEEDED 120 0    ASPIRIN 81 MG OR TABS ONE DAILY 100 3    atorvastatin (LIPITOR) 80 MG tablet Take 1 tablet (80 mg) by mouth daily. 90 tablet 3    EXCEDRIN 250-250-65 MG OR TABS 2 TABLETS EVERY 6 HOURS AS NEEDED 80 0    HEMP OIL OR EXTRACT OR OTHER CBD CANNABINOID, NOT MEDICAL CANNABIS, PRN at Bedtime      hydrocortisone (WESTCORT) 0.2 % external cream Apply sparingly to affected area three times daily for 14 days. 15 g 3    lisinopril-hydrochlorothiazide (ZESTORETIC) 20-12.5 MG tablet Take 1 tablet by mouth daily. 90 tablet 3    sertraline (ZOLOFT) 50 MG tablet Take 1 tablet (50 mg) by mouth daily. 90 tablet 3       No Known Allergies    Family History   Problem Relation Age of Onset    Hypertension Mother     Lipids Mother     C.A.D. Mother     Atrial fibrillation Mother     Hypertension Father          at age 63    Lipids Father         triglycerides    C.A.D. Father     C.A.D. Maternal Grandmother     Lung Cancer Maternal Grandfather         smoker    Chronic Obstructive Pulmonary Disease Paternal Grandmother         smoker       Social  History     Socioeconomic History    Marital status:      Spouse name: Nicolasa    Number of children: 0    Years of education: 16    Highest education level: None   Occupational History     Employer: [employer]   Tobacco Use    Smoking status: Never     Passive exposure: Never    Smokeless tobacco: Never   Vaping Use    Vaping status: Never Used   Substance and Sexual Activity    Alcohol use: Not Currently     Alcohol/week: 0.0 - 1.0 standard drinks of alcohol    Drug use: No    Sexual activity: Yes     Partners: Female   Other Topics Concern    Caffeine Concern Yes     Comment: 2 cups qd    Exercise Yes     Comment: 3-4 times per week, walks 6-7 times per week    Seat Belt Yes    Parent/sibling w/ CABG, MI or angioplasty before 65F 55M? Yes     Social Drivers of Health     Financial Resource Strain: Low Risk  (2/6/2025)    Financial Resource Strain     Within the past 12 months, have you or your family members you live with been unable to get utilities (heat, electricity) when it was really needed?: No   Food Insecurity: Low Risk  (2/6/2025)    Food Insecurity     Within the past 12 months, did you worry that your food would run out before you got money to buy more?: No     Within the past 12 months, did the food you bought just not last and you didn t have money to get more?: No   Transportation Needs: Low Risk  (2/6/2025)    Transportation Needs     Within the past 12 months, has lack of transportation kept you from medical appointments, getting your medicines, non-medical meetings or appointments, work, or from getting things that you need?: No   Physical Activity: Unknown (2/6/2025)    Exercise Vital Sign     Days of Exercise per Week: 4 days   Stress: No Stress Concern Present (2/6/2025)    [employer] - Occupational Stress Questionnaire     Feeling of Stress : Only a little   Social Connections: Unknown (2/6/2025)    Social Connection and Isolation Panel  "[NHANES]     Frequency of Social Gatherings with Friends and Family: Once a week   Interpersonal Safety: Low Risk  (2/6/2025)    Interpersonal Safety     Do you feel physically and emotionally safe where you currently live?: Yes     Within the past 12 months, have you been hit, slapped, kicked or otherwise physically hurt by someone?: No     Within the past 12 months, have you been humiliated or emotionally abused in other ways by your partner or ex-partner?: No   Housing Stability: Low Risk  (2/6/2025)    Housing Stability     Do you have housing? : Yes     Are you worried about losing your housing?: No       Colonoscopy:  due 7/2026  FIT / Cologuard: ordered  PSA: pending      Review of Systems  CONSTITUTIONAL: NEGATIVE for fever, chills, change in weight  INTEGUMENTARY/SKIN: NEGATIVE for worrisome rashes, moles or lesions  EYES: NEGATIVE for vision changes or irritation  ENT/MOUTH: NEGATIVE for ear, mouth and throat problems  RESP: NEGATIVE for significant cough or SOB  CV: NEGATIVE for chest pain, palpitations or peripheral edema  GI: NEGATIVE for nausea, abdominal pain, heartburn, or change in bowel habits  : NEGATIVE for frequency, dysuria, or hematuria  MUSCULOSKELETAL: NEGATIVE for significant arthralgias or myalgia  NEURO: NEGATIVE for weakness, dizziness or paresthesias  ENDOCRINE: NEGATIVE for temperature intolerance, skin/hair changes  HEME: NEGATIVE for bleeding problems  PSYCHIATRIC: NEGATIVE for changes in mood or affect     Objective    Exam  /72   Pulse 83   Temp (!) 96.3  F (35.7  C) (Tympanic)   Resp 16   Ht 1.772 m (5' 9.75\")   Wt 89.4 kg (197 lb)   SpO2 98%   BMI 28.47 kg/m     Estimated body mass index is 28.47 kg/m  as calculated from the following:    Height as of this encounter: 1.772 m (5' 9.75\").    Weight as of this encounter: 89.4 kg (197 lb).    Physical Exam  GENERAL: alert and no distress  EYES: Eyes grossly normal to inspection, PERRL and conjunctivae and sclerae " normal  HENT: ear canals and TM's normal, nose and mouth without ulcers or lesions  NECK: no adenopathy, no asymmetry, masses, or scars  RESP: lungs clear to auscultation - no rales, rhonchi or wheezes  CV: regular rate and rhythm, normal S1 S2, no S3 or S4, no murmur, click or rub, no peripheral edema  ABDOMEN: soft, nontender, no hepatosplenomegaly, no masses and bowel sounds normal   (male): pt declines  RECTAL: pt declines  MS: no gross musculoskeletal defects noted, no edema  SKIN: no suspicious lesions or rashes  NEURO: Normal strength and tone, mentation intact and speech normal  PSYCH: mentation appears normal, affect normal/bright    Signed Electronically by:            Anthony Gutiérrez MD, FAAFP     [facility]  [address] [place] [id number]  [email or web address]   Office: [phone]  Fax: [phone]       Answers submitted by the patient for this visit:  Patient Health Questionnaire (Submitted on 2/6/2025)  If you checked off any problems, how difficult have these problems made it for you to do your work, take care of things at home, or get along with other people?: Not difficult at all  PHQ9 TOTAL SCORE: 0

## 2025-02-08 DIAGNOSIS — R73.09 ABNORMAL GLUCOSE: Primary | ICD-10-CM
